# Patient Record
Sex: MALE | Race: BLACK OR AFRICAN AMERICAN | NOT HISPANIC OR LATINO | Employment: OTHER | URBAN - METROPOLITAN AREA
[De-identification: names, ages, dates, MRNs, and addresses within clinical notes are randomized per-mention and may not be internally consistent; named-entity substitution may affect disease eponyms.]

---

## 2019-04-16 ENCOUNTER — HOSPITAL ENCOUNTER (EMERGENCY)
Facility: OTHER | Age: 69
Discharge: HOME | End: 2019-04-16
Attending: EMERGENCY MEDICINE
Payer: MEDICARE

## 2019-04-16 VITALS
HEIGHT: 69 IN | TEMPERATURE: 97 F | HEART RATE: 48 BPM | BODY MASS INDEX: 25.77 KG/M2 | WEIGHT: 174 LBS | SYSTOLIC BLOOD PRESSURE: 225 MMHG | DIASTOLIC BLOOD PRESSURE: 105 MMHG | OXYGEN SATURATION: 95 % | RESPIRATION RATE: 18 BRPM

## 2019-04-16 DIAGNOSIS — I10 HYPERTENSION, UNSPECIFIED TYPE: ICD-10-CM

## 2019-04-16 DIAGNOSIS — R79.89 ELEVATED SERUM CREATININE: ICD-10-CM

## 2019-04-16 DIAGNOSIS — R42 DIZZINESS: Primary | ICD-10-CM

## 2019-04-16 LAB
ALBUMIN SERPL BCP-MCNC: 3.8 G/DL (ref 3.5–5.2)
ALP SERPL-CCNC: 69 U/L (ref 55–135)
ALT SERPL W/O P-5'-P-CCNC: 7 U/L (ref 10–44)
ANION GAP SERPL CALC-SCNC: 11 MMOL/L (ref 8–16)
AST SERPL-CCNC: 12 U/L (ref 10–40)
BASOPHILS # BLD AUTO: 0.05 K/UL (ref 0–0.2)
BASOPHILS NFR BLD: 0.8 % (ref 0–1.9)
BILIRUB SERPL-MCNC: 0.5 MG/DL (ref 0.1–1)
BUN SERPL-MCNC: 19 MG/DL (ref 8–23)
CALCIUM SERPL-MCNC: 10.1 MG/DL (ref 8.7–10.5)
CHLORIDE SERPL-SCNC: 106 MMOL/L (ref 95–110)
CO2 SERPL-SCNC: 23 MMOL/L (ref 23–29)
CREAT SERPL-MCNC: 2 MG/DL (ref 0.5–1.4)
DIFFERENTIAL METHOD: ABNORMAL
EOSINOPHIL # BLD AUTO: 0.1 K/UL (ref 0–0.5)
EOSINOPHIL NFR BLD: 1.7 % (ref 0–8)
ERYTHROCYTE [DISTWIDTH] IN BLOOD BY AUTOMATED COUNT: 15.4 % (ref 11.5–14.5)
EST. GFR  (AFRICAN AMERICAN): 39 ML/MIN/1.73 M^2
EST. GFR  (NON AFRICAN AMERICAN): 34 ML/MIN/1.73 M^2
GLUCOSE SERPL-MCNC: 89 MG/DL (ref 70–110)
HCT VFR BLD AUTO: 40.1 % (ref 40–54)
HGB BLD-MCNC: 14.3 G/DL (ref 14–18)
LYMPHOCYTES # BLD AUTO: 1.9 K/UL (ref 1–4.8)
LYMPHOCYTES NFR BLD: 29.1 % (ref 18–48)
MCH RBC QN AUTO: 29.8 PG (ref 27–31)
MCHC RBC AUTO-ENTMCNC: 35.7 G/DL (ref 32–36)
MCV RBC AUTO: 84 FL (ref 82–98)
MONOCYTES # BLD AUTO: 0.5 K/UL (ref 0.3–1)
MONOCYTES NFR BLD: 7.4 % (ref 4–15)
NEUTROPHILS # BLD AUTO: 3.9 K/UL (ref 1.8–7.7)
NEUTROPHILS NFR BLD: 60.8 % (ref 38–73)
PLATELET # BLD AUTO: 217 K/UL (ref 150–350)
PMV BLD AUTO: 10.8 FL (ref 9.2–12.9)
POTASSIUM SERPL-SCNC: 3 MMOL/L (ref 3.5–5.1)
PROT SERPL-MCNC: 7.6 G/DL (ref 6–8.4)
RBC # BLD AUTO: 4.8 M/UL (ref 4.6–6.2)
SODIUM SERPL-SCNC: 140 MMOL/L (ref 136–145)
WBC # BLD AUTO: 6.45 K/UL (ref 3.9–12.7)

## 2019-04-16 PROCEDURE — 25000003 PHARM REV CODE 250: Performed by: PHYSICIAN ASSISTANT

## 2019-04-16 PROCEDURE — 80053 COMPREHEN METABOLIC PANEL: CPT

## 2019-04-16 PROCEDURE — 85025 COMPLETE CBC W/AUTO DIFF WBC: CPT

## 2019-04-16 PROCEDURE — 96360 HYDRATION IV INFUSION INIT: CPT

## 2019-04-16 PROCEDURE — 99283 EMERGENCY DEPT VISIT LOW MDM: CPT | Mod: 25

## 2019-04-16 RX ORDER — LOSARTAN POTASSIUM 50 MG/1
50 TABLET ORAL DAILY
Qty: 30 TABLET | Refills: 0 | Status: SHIPPED | OUTPATIENT
Start: 2019-04-16 | End: 2019-05-16

## 2019-04-16 RX ORDER — POTASSIUM CHLORIDE 750 MG/1
20 TABLET, EXTENDED RELEASE ORAL DAILY
Qty: 14 TABLET | Refills: 0 | Status: SHIPPED | OUTPATIENT
Start: 2019-04-16 | End: 2019-04-23

## 2019-04-16 RX ORDER — POTASSIUM CHLORIDE 20 MEQ/15ML
40 SOLUTION ORAL
Status: COMPLETED | OUTPATIENT
Start: 2019-04-16 | End: 2019-04-16

## 2019-04-16 RX ORDER — HYDROCHLOROTHIAZIDE 25 MG/1
25 TABLET ORAL
Status: COMPLETED | OUTPATIENT
Start: 2019-04-16 | End: 2019-04-16

## 2019-04-16 RX ORDER — LISINOPRIL AND HYDROCHLOROTHIAZIDE 20; 25 MG/1; MG/1
1 TABLET ORAL DAILY
Qty: 30 TABLET | Refills: 0 | Status: SHIPPED | OUTPATIENT
Start: 2019-04-16 | End: 2019-04-16 | Stop reason: ALTCHOICE

## 2019-04-16 RX ORDER — LISINOPRIL 10 MG/1
20 TABLET ORAL
Status: COMPLETED | OUTPATIENT
Start: 2019-04-16 | End: 2019-04-16

## 2019-04-16 RX ADMIN — LISINOPRIL 20 MG: 10 TABLET ORAL at 08:04

## 2019-04-16 RX ADMIN — POTASSIUM CHLORIDE 40 MEQ: 40 SOLUTION ORAL at 09:04

## 2019-04-16 RX ADMIN — SODIUM CHLORIDE 1000 ML: 0.9 INJECTION, SOLUTION INTRAVENOUS at 08:04

## 2019-04-16 RX ADMIN — HYDROCHLOROTHIAZIDE 25 MG: 25 TABLET ORAL at 08:04

## 2019-04-17 NOTE — ED TRIAGE NOTES
Pt reports hx of hypertension, states he has been out of his BP medication x 3 weeks, reports dizziness. Pt unsure what medication he is suppose to take. Pt is AAO x 3, answers questions appropriately

## 2019-04-17 NOTE — DISCHARGE INSTRUCTIONS
Your potassium is low (3.0), and Creatinine is high (2.0). You will need repeat blood tests with your primary care doctor in 1 week.    Return to the ER for any worsening symptoms.

## 2019-04-17 NOTE — ED PROVIDER NOTES
"Encounter Date: 4/16/2019       History     Chief Complaint   Patient presents with    Hypertension     pt out of unknown BP medication x3 weeks. pt reports dizziness but denies sob, cp, vision changes, n/v, headache.      67 y/o male with history of HTN presents to the ER with chief complaint of dizziness, which has been intermittent since this morning.   Patient says that he felt like he was "staggering" and lightheaded. This occurs when he stands from a seated position.  He denies syncope or room spinning sensation.  He denies headache, vision changes, difficulty speaking, facial asymmetry , nausea, vomiting, fever, chest pain, SOB.  He says he has been out of his blood pressure medication for 3 weeks.  Patient says his doctor increased his dose to 20 mg daily in October of 2018, but he does not know the name of his medication.          Review of patient's allergies indicates:  No Known Allergies  Past Medical History:   Diagnosis Date    Hypertension      History reviewed. No pertinent surgical history.  History reviewed. No pertinent family history.  Social History     Tobacco Use    Smoking status: Former Smoker   Substance Use Topics    Alcohol use: Not Currently    Drug use: Not on file     Review of Systems   Constitutional: Negative for chills and fever.   HENT: Negative for sore throat.    Eyes: Negative for visual disturbance.   Respiratory: Negative for shortness of breath.    Cardiovascular: Negative for chest pain.   Gastrointestinal: Negative for abdominal pain, nausea and vomiting.   Genitourinary: Negative for dysuria.   Musculoskeletal: Negative for back pain.   Skin: Negative for rash.   Neurological: Positive for dizziness. Negative for speech difficulty, weakness, numbness and headaches.   Hematological: Does not bruise/bleed easily.   Psychiatric/Behavioral: Negative for confusion.       Physical Exam     Initial Vitals [04/16/19 1848]   BP Pulse Resp Temp SpO2   (!) 204/95 66 18 97.7 °F " (36.5 °C) 99 %      MAP       --         Physical Exam    Nursing note and vitals reviewed.  Constitutional: He appears well-developed and well-nourished.   HENT:   Head: Atraumatic.   Mouth/Throat: Oropharynx is clear and moist.   Eyes: Conjunctivae and EOM are normal. Pupils are equal, round, and reactive to light.   Neck: Normal range of motion. Neck supple.   Cardiovascular: Normal rate, regular rhythm and intact distal pulses.   Pulmonary/Chest: Breath sounds normal. No respiratory distress. He has no wheezes. He has no rhonchi. He has no rales.   Abdominal: Soft. Bowel sounds are normal. There is no tenderness.   Neurological: He is alert and oriented to person, place, and time. He has normal strength. No cranial nerve deficit or sensory deficit. He displays a negative Romberg sign. Gait normal.   Normal finger to nose   Skin: No rash noted.   Psychiatric: He has a normal mood and affect.         ED Course   Procedures  Labs Reviewed   CBC W/ AUTO DIFFERENTIAL - Abnormal; Notable for the following components:       Result Value    RDW 15.4 (*)     All other components within normal limits   COMPREHENSIVE METABOLIC PANEL - Abnormal; Notable for the following components:    Potassium 3.0 (*)     Creatinine 2.0 (*)     ALT 7 (*)     eGFR if  39 (*)     eGFR if non  34 (*)     All other components within normal limits          Imaging Results    None                APC / Resident Notes:   Patient presents to the ER for evaluation of dizziness, which has been intermittent since this morning as well as being out of his blood pressure medications.  I have contacted the patient's pharmacy to confirm which antihypertensives he has been prescribed.  The pharmacist informed me that he last filled lisinopril/HCTZ 20/25 mg on July 26, 2018 for a 90 day supply.  The patient admits that it has been months since he took his blood pressure medications.  He is complaining of lightheadedness with  standing today.  I have considered dehydration, electrolyte abnormalities, acute kidney injury, I will order a blood work, give IV fluids, give home dose of antihypertensives and reassess.  Patient does not have any neuro deficits on exam, weakness, numbness, ataxia, trouble with speech or vision and his evaluation is not concerning for CVA.  I discussed the care this patient my supervising physician.  Patient was also seen by her.   The patient has a history of hypertension and and this is likely long-standing uncontrolled hypertension.   Patient has Creatinine of 2.0 and hypokalemia with K=3.0.  Will replace potassium orally and give prescription for potassium 20 meQ x 1 week.  I have advised increased fluids, he has received 1 liter IV fluids in the ED and no longer has lightheadedness with standing.  Will provided with PCP clinic # and advise that he f/u with PCP in 1 week for repeat labs.  Patient is in agreement with plan.  He says he is feeling better prior to discharge.    BP increased to 225/105 prior to discharge, will give prescription for losartan for him to take instead of lisinopril.  He is given strict return precautions.                       Clinical Impression:       ICD-10-CM ICD-9-CM   1. Dizziness R42 780.4   2. Hypertension, unspecified type I10 401.9   3. Elevated serum creatinine R79.89 790.99                                ARIN Vitale  04/16/19 8746

## 2023-02-12 ENCOUNTER — HOSPITAL ENCOUNTER (INPATIENT)
Facility: OTHER | Age: 73
LOS: 5 days | Discharge: HOME HEALTH | DRG: 280 | End: 2023-02-17
Attending: EMERGENCY MEDICINE | Admitting: HOSPITALIST
Payer: MEDICARE

## 2023-02-12 DIAGNOSIS — R53.1 WEAKNESS: ICD-10-CM

## 2023-02-12 DIAGNOSIS — G93.40 ENCEPHALOPATHY ACUTE: Primary | ICD-10-CM

## 2023-02-12 DIAGNOSIS — F19.10 POLYSUBSTANCE ABUSE: ICD-10-CM

## 2023-02-12 DIAGNOSIS — R79.89 ELEVATED TROPONIN: ICD-10-CM

## 2023-02-12 DIAGNOSIS — T68.XXXA HYPOTHERMIA, INITIAL ENCOUNTER: ICD-10-CM

## 2023-02-12 DIAGNOSIS — J96.01 ACUTE RESPIRATORY FAILURE WITH HYPOXIA: ICD-10-CM

## 2023-02-12 DIAGNOSIS — I16.1 HYPERTENSIVE EMERGENCY: ICD-10-CM

## 2023-02-12 DIAGNOSIS — J96.02 ACUTE HYPERCAPNIC RESPIRATORY FAILURE: ICD-10-CM

## 2023-02-12 DIAGNOSIS — R53.81 DEBILITY: ICD-10-CM

## 2023-02-12 PROBLEM — E87.20 METABOLIC ACIDOSIS: Status: ACTIVE | Noted: 2023-02-12

## 2023-02-12 PROBLEM — D72.829 LEUKOCYTOSIS: Status: ACTIVE | Noted: 2023-02-12

## 2023-02-12 PROBLEM — N17.9 ACUTE RENAL FAILURE: Status: ACTIVE | Noted: 2023-02-12

## 2023-02-12 PROBLEM — G93.41 ENCEPHALOPATHY, METABOLIC: Status: ACTIVE | Noted: 2023-02-12

## 2023-02-12 LAB
ALLENS TEST: ABNORMAL
AMMONIA PLAS-SCNC: 61 UMOL/L (ref 10–50)
AMPHET+METHAMPHET UR QL: NEGATIVE
BACTERIA #/AREA URNS HPF: ABNORMAL /HPF
BARBITURATES UR QL SCN>200 NG/ML: NEGATIVE
BASOPHILS # BLD AUTO: ABNORMAL K/UL (ref 0–0.2)
BASOPHILS NFR BLD: 0 % (ref 0–1.9)
BENZODIAZ UR QL SCN>200 NG/ML: NEGATIVE
BILIRUB UR QL STRIP: NEGATIVE
BNP SERPL-MCNC: 863 PG/ML (ref 0–99)
BZE UR QL SCN: ABNORMAL
CANNABINOIDS UR QL SCN: ABNORMAL
CK SERPL-CCNC: 434 U/L (ref 20–200)
CLARITY UR: CLEAR
COLOR UR: YELLOW
CREAT UR-MCNC: 33.4 MG/DL (ref 23–375)
DELSYS: ABNORMAL
DIFFERENTIAL METHOD: ABNORMAL
EOSINOPHIL # BLD AUTO: ABNORMAL K/UL (ref 0–0.5)
EOSINOPHIL NFR BLD: 0 % (ref 0–8)
ERYTHROCYTE [DISTWIDTH] IN BLOOD BY AUTOMATED COUNT: 15.4 % (ref 11.5–14.5)
ERYTHROCYTE [SEDIMENTATION RATE] IN BLOOD BY WESTERGREN METHOD: 24 MM/H
ETHANOL SERPL-MCNC: <10 MG/DL
ETHANOL UR-MCNC: <10 MG/DL
FIO2: 100
FIO2: 35
GLUCOSE UR QL STRIP: ABNORMAL
HCO3 UR-SCNC: 26 MMOL/L (ref 24–28)
HCO3 UR-SCNC: 26.7 MMOL/L (ref 24–28)
HCT VFR BLD AUTO: 45.3 % (ref 40–54)
HCT VFR BLD CALC: 42 %PCV (ref 36–54)
HCT VFR BLD CALC: 48 %PCV (ref 36–54)
HGB BLD-MCNC: 14 G/DL
HGB BLD-MCNC: 15 G/DL (ref 14–18)
HGB BLD-MCNC: 16 G/DL
HGB UR QL STRIP: ABNORMAL
HYALINE CASTS #/AREA URNS LPF: 3 /LPF
IMM GRANULOCYTES # BLD AUTO: ABNORMAL K/UL (ref 0–0.04)
IMM GRANULOCYTES NFR BLD AUTO: ABNORMAL % (ref 0–0.5)
KETONES UR QL STRIP: NEGATIVE
LACTATE SERPL-SCNC: 2.5 MMOL/L (ref 0.5–2.2)
LACTATE SERPL-SCNC: 5.1 MMOL/L (ref 0.5–2.2)
LDH SERPL L TO P-CCNC: 5.38 MMOL/L (ref 0.5–2.2)
LEUKOCYTE ESTERASE UR QL STRIP: NEGATIVE
LYMPHOCYTES # BLD AUTO: ABNORMAL K/UL (ref 1–4.8)
LYMPHOCYTES NFR BLD: 8 % (ref 18–48)
MAGNESIUM SERPL-MCNC: 2.2 MG/DL (ref 1.6–2.6)
MCH RBC QN AUTO: 30.4 PG (ref 27–31)
MCHC RBC AUTO-ENTMCNC: 33.1 G/DL (ref 32–36)
MCV RBC AUTO: 92 FL (ref 82–98)
METHADONE UR QL SCN>300 NG/ML: NEGATIVE
MICROSCOPIC COMMENT: ABNORMAL
MODE: ABNORMAL
MONOCYTES # BLD AUTO: ABNORMAL K/UL (ref 0.3–1)
MONOCYTES NFR BLD: 9 % (ref 4–15)
NEUTROPHILS # BLD AUTO: ABNORMAL K/UL (ref 1.8–7.7)
NEUTROPHILS NFR BLD: 79 % (ref 38–73)
NEUTS BAND NFR BLD MANUAL: 4 %
NITRITE UR QL STRIP: NEGATIVE
NRBC BLD-RTO: 0 /100 WBC
OPIATES UR QL SCN: NEGATIVE
PCO2 BLDA: 53.3 MMHG (ref 35–45)
PCO2 BLDA: 71 MMHG (ref 35–45)
PCP UR QL SCN>25 NG/ML: NEGATIVE
PEEP: 5
PH SMN: 7.18 [PH] (ref 7.35–7.45)
PH SMN: 7.3 [PH] (ref 7.35–7.45)
PH UR STRIP: 7 [PH] (ref 5–8)
PLATELET # BLD AUTO: 258 K/UL (ref 150–450)
PLATELET BLD QL SMEAR: ABNORMAL
PMV BLD AUTO: 11.1 FL (ref 9.2–12.9)
PO2 BLDA: 330 MMHG (ref 80–100)
PO2 BLDA: 82 MMHG (ref 80–100)
POC BE: -2 MMOL/L
POC BE: 0 MMOL/L
POC IONIZED CALCIUM: 1.13 MMOL/L (ref 1.06–1.42)
POC IONIZED CALCIUM: 1.17 MMOL/L (ref 1.06–1.42)
POC SATURATED O2: 100 % (ref 95–100)
POC SATURATED O2: 94 % (ref 95–100)
POC TCO2: 28 MMOL/L (ref 23–27)
POC TCO2: 29 MMOL/L (ref 23–27)
POTASSIUM BLD-SCNC: 2.9 MMOL/L (ref 3.5–5.1)
POTASSIUM BLD-SCNC: 3.5 MMOL/L (ref 3.5–5.1)
PROCALCITONIN SERPL IA-MCNC: 1.09 NG/ML
PROT UR QL STRIP: ABNORMAL
RBC # BLD AUTO: 4.94 M/UL (ref 4.6–6.2)
RBC #/AREA URNS HPF: 1 /HPF (ref 0–4)
SAMPLE: ABNORMAL
SAMPLE: NORMAL
SARS-COV-2 RDRP RESP QL NAA+PROBE: NEGATIVE
SITE: ABNORMAL
SODIUM BLD-SCNC: 141 MMOL/L (ref 136–145)
SODIUM BLD-SCNC: 142 MMOL/L (ref 136–145)
SP GR UR STRIP: 1.01 (ref 1–1.03)
TOXICOLOGY INFORMATION: ABNORMAL
TROPONIN I SERPL DL<=0.01 NG/ML-MCNC: 0.05 NG/ML (ref 0–0.03)
URN SPEC COLLECT METH UR: ABNORMAL
UROBILINOGEN UR STRIP-ACNC: NEGATIVE EU/DL
VT: 300
WBC # BLD AUTO: 23.42 K/UL (ref 3.9–12.7)
WBC #/AREA URNS HPF: 2 /HPF (ref 0–5)

## 2023-02-12 PROCEDURE — 82550 ASSAY OF CK (CPK): CPT | Performed by: EMERGENCY MEDICINE

## 2023-02-12 PROCEDURE — 81000 URINALYSIS NONAUTO W/SCOPE: CPT | Performed by: EMERGENCY MEDICINE

## 2023-02-12 PROCEDURE — 93005 ELECTROCARDIOGRAM TRACING: CPT

## 2023-02-12 PROCEDURE — 63600175 PHARM REV CODE 636 W HCPCS: Performed by: HOSPITALIST

## 2023-02-12 PROCEDURE — 20000000 HC ICU ROOM

## 2023-02-12 PROCEDURE — 80307 DRUG TEST PRSMV CHEM ANLYZR: CPT | Performed by: EMERGENCY MEDICINE

## 2023-02-12 PROCEDURE — 63600175 PHARM REV CODE 636 W HCPCS

## 2023-02-12 PROCEDURE — 36600 WITHDRAWAL OF ARTERIAL BLOOD: CPT

## 2023-02-12 PROCEDURE — 25000003 PHARM REV CODE 250

## 2023-02-12 PROCEDURE — 99900026 HC AIRWAY MAINTENANCE (STAT)

## 2023-02-12 PROCEDURE — 94761 N-INVAS EAR/PLS OXIMETRY MLT: CPT

## 2023-02-12 PROCEDURE — 96365 THER/PROPH/DIAG IV INF INIT: CPT

## 2023-02-12 PROCEDURE — 87150 DNA/RNA AMPLIFIED PROBE: CPT | Mod: 59 | Performed by: EMERGENCY MEDICINE

## 2023-02-12 PROCEDURE — 99223 1ST HOSP IP/OBS HIGH 75: CPT | Mod: ,,, | Performed by: HOSPITALIST

## 2023-02-12 PROCEDURE — 27000221 HC OXYGEN, UP TO 24 HOURS

## 2023-02-12 PROCEDURE — 80053 COMPREHEN METABOLIC PANEL: CPT | Performed by: EMERGENCY MEDICINE

## 2023-02-12 PROCEDURE — 82077 ASSAY SPEC XCP UR&BREATH IA: CPT | Performed by: EMERGENCY MEDICINE

## 2023-02-12 PROCEDURE — 99223 PR INITIAL HOSPITAL CARE,LEVL III: ICD-10-PCS | Mod: ,,, | Performed by: HOSPITALIST

## 2023-02-12 PROCEDURE — 31500 INSERT EMERGENCY AIRWAY: CPT

## 2023-02-12 PROCEDURE — 83880 ASSAY OF NATRIURETIC PEPTIDE: CPT | Performed by: EMERGENCY MEDICINE

## 2023-02-12 PROCEDURE — 82803 BLOOD GASES ANY COMBINATION: CPT

## 2023-02-12 PROCEDURE — 99292 CRITICAL CARE ADDL 30 MIN: CPT

## 2023-02-12 PROCEDURE — 85007 BL SMEAR W/DIFF WBC COUNT: CPT | Performed by: EMERGENCY MEDICINE

## 2023-02-12 PROCEDURE — 63600175 PHARM REV CODE 636 W HCPCS: Performed by: EMERGENCY MEDICINE

## 2023-02-12 PROCEDURE — 25000003 PHARM REV CODE 250: Performed by: HOSPITALIST

## 2023-02-12 PROCEDURE — 83735 ASSAY OF MAGNESIUM: CPT | Performed by: EMERGENCY MEDICINE

## 2023-02-12 PROCEDURE — 82140 ASSAY OF AMMONIA: CPT | Performed by: EMERGENCY MEDICINE

## 2023-02-12 PROCEDURE — 85027 COMPLETE CBC AUTOMATED: CPT | Performed by: EMERGENCY MEDICINE

## 2023-02-12 PROCEDURE — 94002 VENT MGMT INPAT INIT DAY: CPT

## 2023-02-12 PROCEDURE — 96366 THER/PROPH/DIAG IV INF ADDON: CPT

## 2023-02-12 PROCEDURE — 99900035 HC TECH TIME PER 15 MIN (STAT)

## 2023-02-12 PROCEDURE — 93010 EKG 12-LEAD: ICD-10-PCS | Mod: ,,, | Performed by: INTERNAL MEDICINE

## 2023-02-12 PROCEDURE — 83605 ASSAY OF LACTIC ACID: CPT | Mod: 91 | Performed by: HOSPITALIST

## 2023-02-12 PROCEDURE — 93010 ELECTROCARDIOGRAM REPORT: CPT | Mod: ,,, | Performed by: INTERNAL MEDICINE

## 2023-02-12 PROCEDURE — 84484 ASSAY OF TROPONIN QUANT: CPT | Performed by: EMERGENCY MEDICINE

## 2023-02-12 PROCEDURE — 99291 CRITICAL CARE FIRST HOUR: CPT | Mod: 25

## 2023-02-12 PROCEDURE — U0002 COVID-19 LAB TEST NON-CDC: HCPCS | Performed by: NURSE PRACTITIONER

## 2023-02-12 PROCEDURE — 83605 ASSAY OF LACTIC ACID: CPT | Performed by: EMERGENCY MEDICINE

## 2023-02-12 PROCEDURE — 87040 BLOOD CULTURE FOR BACTERIA: CPT | Performed by: EMERGENCY MEDICINE

## 2023-02-12 PROCEDURE — 84145 PROCALCITONIN (PCT): CPT | Performed by: EMERGENCY MEDICINE

## 2023-02-12 PROCEDURE — 36415 COLL VENOUS BLD VENIPUNCTURE: CPT | Performed by: HOSPITALIST

## 2023-02-12 PROCEDURE — 25000003 PHARM REV CODE 250: Performed by: EMERGENCY MEDICINE

## 2023-02-12 PROCEDURE — 25000003 PHARM REV CODE 250: Performed by: NURSE PRACTITIONER

## 2023-02-12 RX ORDER — NICARDIPINE HYDROCHLORIDE 0.2 MG/ML
2.5 INJECTION INTRAVENOUS CONTINUOUS
Status: DISCONTINUED | OUTPATIENT
Start: 2023-02-12 | End: 2023-02-13

## 2023-02-12 RX ORDER — HEPARIN SODIUM 5000 [USP'U]/ML
5000 INJECTION, SOLUTION INTRAVENOUS; SUBCUTANEOUS EVERY 8 HOURS
Status: DISCONTINUED | OUTPATIENT
Start: 2023-02-12 | End: 2023-02-17 | Stop reason: HOSPADM

## 2023-02-12 RX ORDER — PANTOPRAZOLE SODIUM 40 MG/10ML
40 INJECTION, POWDER, LYOPHILIZED, FOR SOLUTION INTRAVENOUS DAILY
Status: DISCONTINUED | OUTPATIENT
Start: 2023-02-13 | End: 2023-02-17 | Stop reason: HOSPADM

## 2023-02-12 RX ORDER — PROPOFOL 10 MG/ML
INJECTION, EMULSION INTRAVENOUS
Status: COMPLETED
Start: 2023-02-12 | End: 2023-02-12

## 2023-02-12 RX ORDER — CHLORHEXIDINE GLUCONATE ORAL RINSE 1.2 MG/ML
15 SOLUTION DENTAL 2 TIMES DAILY
Status: DISCONTINUED | OUTPATIENT
Start: 2023-02-12 | End: 2023-02-17 | Stop reason: HOSPADM

## 2023-02-12 RX ORDER — FAMOTIDINE 10 MG/ML
20 INJECTION INTRAVENOUS 2 TIMES DAILY
Status: DISCONTINUED | OUTPATIENT
Start: 2023-02-12 | End: 2023-02-12

## 2023-02-12 RX ORDER — ETOMIDATE 2 MG/ML
20 INJECTION INTRAVENOUS
Status: COMPLETED | OUTPATIENT
Start: 2023-02-12 | End: 2023-02-12

## 2023-02-12 RX ORDER — NICARDIPINE HYDROCHLORIDE 0.2 MG/ML
INJECTION INTRAVENOUS
Status: COMPLETED
Start: 2023-02-12 | End: 2023-02-12

## 2023-02-12 RX ORDER — FAMOTIDINE 10 MG/ML
20 INJECTION INTRAVENOUS NIGHTLY
Status: DISCONTINUED | OUTPATIENT
Start: 2023-02-12 | End: 2023-02-15

## 2023-02-12 RX ORDER — LORAZEPAM 2 MG/ML
2 INJECTION INTRAMUSCULAR
Status: ACTIVE | OUTPATIENT
Start: 2023-02-12 | End: 2023-02-13

## 2023-02-12 RX ORDER — CHLORHEXIDINE GLUCONATE ORAL RINSE 1.2 MG/ML
15 SOLUTION DENTAL 2 TIMES DAILY
Status: DISCONTINUED | OUTPATIENT
Start: 2023-02-12 | End: 2023-02-12

## 2023-02-12 RX ORDER — SUCCINYLCHOLINE CHLORIDE 20 MG/ML
100 INJECTION INTRAMUSCULAR; INTRAVENOUS
Status: COMPLETED | OUTPATIENT
Start: 2023-02-12 | End: 2023-02-12

## 2023-02-12 RX ORDER — LORAZEPAM 2 MG/ML
INJECTION INTRAMUSCULAR
Status: COMPLETED
Start: 2023-02-12 | End: 2023-02-12

## 2023-02-12 RX ORDER — PROPOFOL 10 MG/ML
0-50 INJECTION, EMULSION INTRAVENOUS CONTINUOUS
Status: DISCONTINUED | OUTPATIENT
Start: 2023-02-12 | End: 2023-02-13

## 2023-02-12 RX ORDER — MUPIROCIN 20 MG/G
OINTMENT TOPICAL 2 TIMES DAILY
Status: DISCONTINUED | OUTPATIENT
Start: 2023-02-12 | End: 2023-02-17 | Stop reason: HOSPADM

## 2023-02-12 RX ADMIN — MUPIROCIN: 20 OINTMENT TOPICAL at 10:02

## 2023-02-12 RX ADMIN — NICARDIPINE HYDROCHLORIDE 2.5 MG/HR: 0.2 INJECTION, SOLUTION INTRAVENOUS at 02:02

## 2023-02-12 RX ADMIN — SUCCINYLCHOLINE CHLORIDE 100 MG: 20 INJECTION, SOLUTION INTRAMUSCULAR; INTRAVENOUS; PARENTERAL at 01:02

## 2023-02-12 RX ADMIN — CEFEPIME 2 G: 2 INJECTION, POWDER, FOR SOLUTION INTRAMUSCULAR; INTRAVENOUS at 02:02

## 2023-02-12 RX ADMIN — PROPOFOL 1000 MG: 10 INJECTION, EMULSION INTRAVENOUS at 01:02

## 2023-02-12 RX ADMIN — FAMOTIDINE 20 MG: 10 INJECTION, SOLUTION INTRAVENOUS at 10:02

## 2023-02-12 RX ADMIN — LORAZEPAM 2 MG: 2 INJECTION INTRAMUSCULAR; INTRAVENOUS at 01:02

## 2023-02-12 RX ADMIN — PROPOFOL 20 MCG/KG/MIN: 10 INJECTION, EMULSION INTRAVENOUS at 10:02

## 2023-02-12 RX ADMIN — ETOMIDATE 20 MG: 2 INJECTION INTRAVENOUS at 01:02

## 2023-02-12 RX ADMIN — PROPOFOL 200 MG: 10 INJECTION, EMULSION INTRAVENOUS at 01:02

## 2023-02-12 RX ADMIN — VANCOMYCIN HYDROCHLORIDE 2000 MG: 500 INJECTION, POWDER, LYOPHILIZED, FOR SOLUTION INTRAVENOUS at 03:02

## 2023-02-12 RX ADMIN — NICARDIPINE HYDROCHLORIDE 2.5 MG/HR: 0.2 INJECTION INTRAVENOUS at 02:02

## 2023-02-12 RX ADMIN — CHLORHEXIDINE GLUCONATE 0.12% ORAL RINSE 15 ML: 1.2 LIQUID ORAL at 10:02

## 2023-02-12 RX ADMIN — SODIUM CHLORIDE 2400 ML: 9 INJECTION, SOLUTION INTRAVENOUS at 03:02

## 2023-02-12 RX ADMIN — HEPARIN SODIUM 5000 UNITS: 5000 INJECTION INTRAVENOUS; SUBCUTANEOUS at 10:02

## 2023-02-12 NOTE — Clinical Note
Diagnosis: Hypertensive emergency [020079]   Admitting Provider:: RAVI MANLEY [[id number]]   Future Attending Provider: RAVI MANLEY [[id number]]   Reason for IP Medical Treatment  (Clinical interventions that can only be accomplished in the IP setting? ) :: Vented on multiple drips   Estimated Length of Stay:: 2 midnights   I certify that Inpatient services for greater than or equal to 2 midnights are medically necessary:: No   Plans for Post-Acute care--if anticipated (pick the single best option):: A. No post acute care anticipated at this time   Special Needs:: Intubated [19]   Special Needs:: Complex IV Med Admin [7]

## 2023-02-12 NOTE — CARE UPDATE
02/12/23 1450   Patient Assessment/Suction   All Lung Fields Breath Sounds coarse   HAILY Breath Sounds coarse   LLL Breath Sounds coarse   RUL Breath Sounds coarse   RML Breath Sounds coarse   RLL Breath Sounds coarse   Rhythm/Pattern, Respiratory assisted mechanically   Cough Frequency frequent   Cough Type assisted   Suction Method tracheal;oral   $ Suction Charges Inline Suction Procedure Stat Charge   Secretions Amount moderate   Secretions Color clear   Secretions Characteristics thin   Skin Integrity   $ Wound Care Tech Time 15 min   Area Observed Left;Right;Behind ear;Cheek;Bridge of nose;Upper lip;Nares   Skin Appearance without discoloration   PRE-TX-O2   Device (Oxygen Therapy) ventilator   Oxygen Concentration (%) 100   SpO2 100 %   Pulse Oximetry Type Continuous   Pulse 101   Resp (!) 21   Temp 96.4 °F (35.8 °C)   BP (!) 154/83   General Safety Checklist   Safety Promotion/Fall Prevention side rails raised   Airway Safety   Ambu bag with the patient? Yes, Adult Ambu   Is mask with the patient? Yes, Adult Mask   Suction set is at the bedside? Yes   Airway Assistance   RT at bedside sws   Vent Select   Conventional Vent Y   Ventilator Initiated Yes   $ Ventilator Initial 1   Charged w/in last 24h YES   Preset Conventional Ventilator Settings   Vent Mode A/C   Humidity HME   Set Rate 18 BPM   Vt Set 300 mL   PEEP/CPAP 5 cmH20   Patient Ventilator Parameters   Resp Rate Total 24 br/min   All Fields Breath Sounds coarse   Peak Airway Pressure 23 cmH20   Mean Airway Pressure 11 cmH20   Exhaled Vt 569 mL   Total Ve 9.46 L/m   Total PEEP 5 cmH20   Tube Type ET   Conventional Ventilator Alarms   Alarms On Y   Ve High Alarm 22 L/min   Ve Low Alarm 2.05 L/min   Vt High Alarm 1100 mL   Vt Low Alarm 210 mL   Resp Rate High Alarm 60 br/min   Press High Alarm 60 cmH2O   Ready to Wean/Extubation Screen   FIO2<=50 (chart decimal) (!) 1   MV<16L (chart vol.) 9.46   PEEP <=8 (chart #) 5   Ready to Wean Parameters   F/VT  Ratio<105 (RSBI) (!) 36.91

## 2023-02-12 NOTE — ED NOTES
Intubated w/ 7.5 ETT (27cm Right), verified w/ colorimetric device, awaiting placement confirmation w/ CXR, Dr. Michael inserted 18fr. Oral GT, placed to suction, no drainage at this time.    Vent Settings:   AC 18/TV  300/ Fi02 100%/ Peep +5. Increase of oral secretions noted, suctioned w/ yankeur times 4.    Bear Hugger placed on pt and lea w/ thermometer device placed.   Pt's clothes placed in pt belongings bag, red gripper socks placed.

## 2023-02-12 NOTE — ED NOTES
Unknown age black male BIB EMS (given 6mg Narcan w/ poor return of responsiveness, GCS 10) , found unresponsive on neutral ground, pt w/ arms flexed, bilateral fists clenched, pt drooling and both eyes deviating to left downward.  Pt also noted to have clenched teeth, placed in trauma 2 and pt prepared for RSI.

## 2023-02-12 NOTE — H&P
"[facility]  History & Physical    Patient Name: Migdalia Dickerson  MRN: [id number]  Patient Class: IP- Inpatient  Admission Date: 2/12/2023  Attending Physician: Shilpi Rosenberg MD   Primary Care Provider: No primary care provider on file.         Patient information was obtained from ER records.     Subjective:     Principal Problem:Hypertensive emergency    Chief Complaint:   Chief Complaint   Patient presents with    Seizures     Unknown pt brought in by EMS for suspected overdose; pt found unresponsive, not breathing, given 6mg narcan total en route; upon arrival to ED, pt actively seizing        HPI: Unknown male was found down in the street.  It was reported that a bystander said his name was "Alejandro" but that person is no longer available to be interviewed.  The fire department reported pinpoint pupils with agonal respiration and he was given Narcan without improvement in his mentation.  On arrival to the hospital, he was noted to be drooling and contracted, and he started having seizure-like activity witnessed by staff.  Physical exam by the ER physician document eye deviation to the left and downward.  He was unable to protect his airway and was intubated.  Noted to be hypothermic with a temperature of 91.6° F, heart rate 131, duration 34, blood pressure 242/144.  Treatment initiated with Cardene drip for blood pressure control and patient was given empiric cefepime and vancomycin after obtaining cultures.  Workup in the ER, remarkable for head CT showed no acute abnormalities but showed "Foci of low attenuation involving the bilateral corona radiata and basal ganglia are suspected to reflect that of remote infarct." U tox remarkable for cocaine and marijuana, BUN and creatinine of 33/2.3, , , troponin 0.049, lactate 5.1 procalcitonin 1.09, serum alcohol negative, WBC 23.42, UA overall not remarkable, chest x-ray without definitive infiltrate, ABG pH 7.184 pCO2 " 71 PO2 330 bicarb 29.        No past medical history on file.    No past surgical history on file.    Review of patient's allergies indicates:  Not on File    No current facility-administered medications on file prior to encounter.     No current outpatient medications on file prior to encounter.     Family History    None       Tobacco Use    Smoking status: Not on file    Smokeless tobacco: Not on file   Substance and Sexual Activity    Alcohol use: Not on file    Drug use: Not on file    Sexual activity: Not on file     Review of Systems   Unable to perform ROS: Intubated   Objective:     Vital Signs (Most Recent):  Temp: 96.6 °F (35.9 °C) (02/12/23 1601)  Pulse: 100 (02/12/23 1601)  Resp: (!) 25 (02/12/23 1601)  BP: (!) 175/97 (02/12/23 1601)  SpO2: 100 % (02/12/23 1601)   Vital Signs (24h Range):  Temp:  [91.6 °F (33.1 °C)-96.6 °F (35.9 °C)] 96.6 °F (35.9 °C)  Pulse:  [] 100  Resp:  [21-36] 25  SpO2:  [97 %-100 %] 100 %  BP: (151-287)/() 175/97     Weight: 80 kg (176 lb 5.9 oz)  There is no height or weight on file to calculate BMI.    Physical Exam  Vitals and nursing note reviewed.   Constitutional:       Comments: Disheveled, thin, sedated   HENT:      Head: Normocephalic.      Comments: ET tube in place, mouth clenched tight around ET tube     Nose: Nose normal.   Eyes:      Conjunctiva/sclera: Conjunctivae normal.   Cardiovascular:      Rate and Rhythm: Regular rhythm. Tachycardia present.   Pulmonary:      Comments: Coarse ventilated breath sounds, no wheezing  Abdominal:      General: There is no distension.      Palpations: Abdomen is soft.      Tenderness: There is no abdominal tenderness. There is no guarding or rebound.      Comments: Hypoactive bowel sounds   Musculoskeletal:      Right lower leg: No edema.      Left lower leg: No edema.   Skin:     General: Skin is dry.      Capillary Refill: Capillary refill takes 2 to 3 seconds.      Comments: Cool to touch   Neurological:       Comments: Arms flexed under tension, grab reflex with attempt with tactile stimulation; withdrawal of left lower with noxious stimulation, reflexes were 2+; no response with verbal stimulation but patient is sedated on propofol; limited neurological exam   Psychiatric:      Comments: Sedated on vent           Significant Labs: All pertinent labs within the past 24 hours have been reviewed.    Significant Imaging: I have reviewed all pertinent imaging results/findings within the past 24 hours.    Assessment/Plan:     * Hypertensive emergency  - Patient met criteria with hypertensive emergency with end organ damage involving the CNS, respiratory, renal, and cardiac systems with systolic greater than 240s  - Encephalopathy likely multifactorial in nature with possible CVA versus seizure vs hypertensive; cannot rule out sepsis versus toxic encephalopathy due to illicit substances given U tox positive for cocaine and marijuana  - Initial head CT without acute findings, but does show remote infarct and microvascular ischemic changes, but CVA has not been completely ruled out at this time  - His hypothermia, could be CNS driven versus septic versus environmental given patient was found down outside for unknown duration of time during winter  - Goal to decrease blood pressure by 25% within the 1st 24 hours, targeting systolic blood pressure of less than 180  - Will continue empiric cefepime and vancomycin for now, follow-up cultures and quickly deescalate if no focus of infection found  - Will need further workup with repeat CT scan of the head or MRI of the brain after stabilization, and check EEG.  No need to start antiepileptic agents at this time given patient is on propofol and will have Ativan p.r.n. for any seizure-like activity  - Further workup with 2D echo and trend troponins and CPK, monitor on telemetry  - Consult Neurology and Pulmonary Critical Care  - Will consider consult to nephrology and Cardiology if  renal function does not improve and troponins continue trend upwards    Encephalopathy, metabolic  - As discussed above    Acute hypercapnic respiratory failure  - Intubated to protect airway  - Consult placed to Pulmonary Critical Care    Acute renal failure  - Unknown baseline renal function  - Replace Pan catheter, monitor strict I&Os  - Workup with renal ultrasound and check repeat labs  - Will consider Nephrology consult renal function does not improve    Elevated troponin  Elevated BNP  - As discussed above  - Will trend and check ECHO, clinically appears dry    Leukocytosis  - As above  - Will continue to trend    Hypothermia  - As discussed above  - Warming blanket    Metabolic acidosis  - As discussed above    Polysubstance abuse  - UTox positive for cocaine and marijuana    VTE Risk Mitigation (From admission, onward)         Ordered     heparin (porcine) injection 5,000 Units  Every 8 hours         02/12/23 1602     IP VTE HIGH RISK PATIENT  Once         02/12/23 1602     Place sequential compression device  Until discontinued         02/12/23 1602                   Shilpi Rosenberg MD  Department of Hospital Medicine   [facility], [place] - Emergency Dept

## 2023-02-12 NOTE — SUBJECTIVE & OBJECTIVE
No past medical history on file.    No past surgical history on file.    Review of patient's allergies indicates:  Not on File    No current facility-administered medications on file prior to encounter.     No current outpatient medications on file prior to encounter.     Family History    None       Tobacco Use    Smoking status: Not on file    Smokeless tobacco: Not on file   Substance and Sexual Activity    Alcohol use: Not on file    Drug use: Not on file    Sexual activity: Not on file     Review of Systems   Unable to perform ROS: Intubated   Objective:     Vital Signs (Most Recent):  Temp: 96.6 °F (35.9 °C) (02/12/23 1601)  Pulse: 100 (02/12/23 1601)  Resp: (!) 25 (02/12/23 1601)  BP: (!) 175/97 (02/12/23 1601)  SpO2: 100 % (02/12/23 1601)   Vital Signs (24h Range):  Temp:  [91.6 °F (33.1 °C)-96.6 °F (35.9 °C)] 96.6 °F (35.9 °C)  Pulse:  [] 100  Resp:  [21-36] 25  SpO2:  [97 %-100 %] 100 %  BP: (151-287)/() 175/97     Weight: 80 kg (176 lb 5.9 oz)  There is no height or weight on file to calculate BMI.    Physical Exam  Vitals and nursing note reviewed.   Constitutional:       Comments: Disheveled, thin, sedated   HENT:      Head: Normocephalic.      Comments: ET tube in place, mouth clenched tight around ET tube     Nose: Nose normal.   Eyes:      Conjunctiva/sclera: Conjunctivae normal.   Cardiovascular:      Rate and Rhythm: Regular rhythm. Tachycardia present.   Pulmonary:      Comments: Coarse ventilated breath sounds, no wheezing  Abdominal:      General: There is no distension.      Palpations: Abdomen is soft.      Tenderness: There is no abdominal tenderness. There is no guarding or rebound.      Comments: Hypoactive bowel sounds   Musculoskeletal:      Right lower leg: No edema.      Left lower leg: No edema.   Skin:     General: Skin is dry.      Capillary Refill: Capillary refill takes 2 to 3 seconds.      Comments: Cool to touch   Neurological:      Comments: Arms flexed under  tension, grab reflex with attempt with tactile stimulation; withdrawal of left lower with noxious stimulation, reflexes were 2+; no response with verbal stimulation but patient is sedated on propofol; limited neurological exam   Psychiatric:      Comments: Sedated on vent           Significant Labs: All pertinent labs within the past 24 hours have been reviewed.    Significant Imaging: I have reviewed all pertinent imaging results/findings within the past 24 hours.

## 2023-02-12 NOTE — PROGRESS NOTES
Pharmacokinetic Initial Assessment: IV Vancomycin    Assessment/Plan:    Vancomycin 2000 mg IV dose given in ED  Desired empiric serum trough concentration is 15 to 20 mcg/mL  Draw vancomycin random level on 2/13/23 at 1500. Re-dose if level is less than   20 mcg/ml    Please contact pharmacy at extension [phone] with any questions regarding this assessment.     Thank you for the consult,   Jay L Schwab       Patient brief summary:  Migdalia Dickerson is a 89 y.o. adult initiated on antimicrobial therapy with IV Vancomycin for treatment of suspected bacteremia    Drug Allergies:   Review of patient's allergies indicates:  Not on File    Actual Body Weight:   80 kg    Renal Function:   CrCl cannot be calculated (Unknown ideal weight.).,     Dialysis Method (if applicable):  N/A    CBC (last 72 hours):  Recent Labs   Lab Result Units 02/12/23  1414   WBC K/uL 23.42*   Hemoglobin g/dL 15.0   Hematocrit % 45.3   Platelets K/uL 258   Gran % % 79.0*   Lymph % % 8.0*   Mono % % 9.0   Eosinophil % % 0.0   Basophil % % 0.0   Differential Method  Manual       Metabolic Panel (last 72 hours):  Recent Labs   Lab Result Units 02/12/23  1414 02/12/23  1423   Sodium mmol/L 142  --    Potassium mmol/L 3.6  --    Chloride mmol/L 104  --    CO2 mmol/L 20*  --    Glucose mg/dL 232*  --    Glucose, UA   --  2+*   BUN mg/dL 33*  --    Creatinine mg/dL 2.3*  --    Creatinine, Urine mg/dL  --  33.4   Albumin g/dL 4.3  --    Total Bilirubin mg/dL 1.1*  --    Alkaline Phosphatase U/L 83  --    AST U/L 35  --    ALT U/L 23  --    Magnesium mg/dL 2.2  --        Drug levels (last 3 results):  No results for input(s): VANCOMYCINRA, VANCORANDOM, VANCOMYCINPE, VANCOPEAK, VANCOMYCINTR, VANCOTROUGH in the last 72 hours.    Microbiologic Results:  Microbiology Results (last 7 days)       Procedure Component Value Units Date/Time    Blood culture x two cultures. Draw prior to antibiotics. [[id number]] Collected: 02/12/23 1418    Order Status: Sent  Specimen: Blood from Peripheral, Jugular, External Right Updated: 02/12/23 1419    Blood culture x two cultures. Draw prior to antibiotics. [[id number]] Collected: 02/12/23 1416    Order Status: Sent Specimen: Blood from Wrist, Right Updated: 02/12/23 1410

## 2023-02-12 NOTE — ED PROVIDER NOTES
"SCRIBE #1 NOTE: I, Eboni Andrews, am scribing for, and in the presence of,  April Michael MD.       Source of History:  EMS personnel.    Chief complaint:  Seizures (Unknown pt brought in by EMS for suspected overdose; pt found unresponsive, not breathing, given 6mg narcan total en route; upon arrival to ED, pt actively seizing)      HPI:  Migdalia Dickerson is a 89 y.o. adult presenting after being found unresponsive on the street/sidewalk. The patient's full name is unknown, but a bystander stated his first name is "Alejandro." The fire department arrived to the scene first and noted pinpoint pupils with agonal respirations. In transport, EMS was unable to provide a GCS for the patient but stated the patient was responsive to pain. The patient had received 6 mg Narcan in total upon arrival to the ED. Here in the ED, patient with L gaze and decorticate posturing on arrival.      This is the extent to the patients complaints today here in the emergency department.    ROS: As per HPI and below:  Cannot be obtained due to condition of patient.    Review of patient's allergies indicates:  Not on File    PMH:  As per HPI and below:  No past medical history on file.  No past surgical history on file.         Physical Exam:    BP (!) 181/100   Pulse 99   Temp 99.7 °F (37.6 °C)   Resp (!) 23   Ht 5' 10" (1.778 m)   Wt 71.2 kg (156 lb 15.5 oz)   SpO2 100%   BMI 22.52 kg/m²   Nursing note and vital signs reviewed.    Appearance: Disheveled appearance. No obvious trauma.  Eyes: No conjunctival injection. Left sided eye deviation.  Neck: No deformity.   ENT: Clear secretions from mouth. Trismus of jaw.  Chest/ Respiratory: Sonorous respirations.  Sternotomy scar.   Cardiovascular: Heart rate 130-140 bpm.  Abdomen: Soft.  Not distended.  Nontender.  No guarding.  No rebound. Non-peritoneal.  Musculoskeletal: Neck supple.  No meningismus. Flexion of both arms.  Skin: Cool/pale skin.  No rashes seen.  Good turgor.  No " abrasions.  No ecchymoses.  Neurologic: Not responding to voice or localizing pain.     Intubation    Date/Time: 2/12/2023 1:40 PM  Location procedure was performed: [facility] EMERGENCY DEPARTMENT  Performed by: April Michael MD  Authorized by: April Michael MD   Consent Done: Emergent Situation  Laryngoscope size: Glide 4  Tube size: 7.5 mm  Number of attempts: 1  Cords visualized: yes  ETT to lip: 26 cm  Chest x-ray interpreted by radiologist and me.  Chest x-ray findings: endotracheal tube in appropriate position      Feeding Tube    Date/Time: 2/12/2023 1:40 PM  Location procedure was performed: [facility] EMERGENCY DEPARTMENT  Performed by: April Michael MD  Authorized by: April Michael MD   Consent: The procedure was performed in an emergent situation.  Tube type: orogastric.  Placement/position confirmation: x-ray  Comments: Aspirated gastric material.      Critical Care    Date/Time: 2/12/2023 1:40 PM  Performed by: April Michael MD  Authorized by: April Michael MD   Direct patient critical care time: 55 minutes  Additional history critical care time: 5 minutes  Ordering / reviewing critical care time: 10 minutes  Documentation critical care time: 10 minutes  Total critical care time (exclusive of procedural time) : 80 minutes  Critical care was time spent personally by me on the following activities: gastric intubation.      I decided to obtain the patient's medical records.  Summary of Medical Records:  None available, patient is not identified     EKG:  I independently reviewed and interpreted the EKG and my findings are as follows:   Sinus rhythm with 1st-degree block with PVCs, LVH repolarization abnormality, prolonged QT rate is 98    Results for orders placed or performed during the hospital encounter of 02/12/23   EKG 12-lead    Collection Time: 02/12/23  3:04 PM    Narrative    Test Reason : R53.1,    Vent. Rate : 098 BPM     Atrial Rate : 098 BPM     P-R Int : 256 ms          QRS Dur : 104 ms      QT Int :  400 ms       P-R-T Axes : 087 072 243 degrees     QTc Int : 510 ms    Sinus rhythm with 1st degree A-V block with occasional Premature  ventricular complexes and Premature atrial complexes  LVH with repolarization abnormality  Prolonged QT  Abnormal ECG      Referred By: System System           Confirmed By:          Imaging:  I independently reviewed and interpreted the patient's chest x-ray and my findings are as follows:  ET tube in good position above karina. NG tube above diaphragm. Pilar prominence. No focal consolidation. No edema. No effusion. No pneumothorax.    Imaging Results              US Retroperitoneal Complete (Final result)  Result time 02/12/23 18:18:32      Final result by Praneeth Malik MD (02/12/23 18:18:32)                   Impression:      1. There is suboptimal evaluation of the left kidney given its position.  The left renal resistive index is elevated as compared to the right, acute medical renal disease on the left is a consideration although not confirmed.  No hydronephrosis.      Electronically signed by: Praneeth Malik MD  Date:    02/12/2023  Time:    18:18               Narrative:    EXAMINATION:  US RETROPERITONEAL COMPLETE    CLINICAL HISTORY:  ARF;    TECHNIQUE:  Ultrasound of the kidneys and urinary bladder was performed including color flow and Doppler evaluation of the kidneys.    COMPARISON:  None.    FINDINGS:  Right kidney: The right kidney measures 11.6 cm. No cortical thinning. No loss of corticomedullary distinction. Resistive index measures 0.65.  No mass. No renal stone. No hydronephrosis.    Left kidney: The left kidney measures 8.7 cm. No cortical thinning. No loss of corticomedullary distinction. Resistive index measures 0.75.  No mass. No renal stone. No hydronephrosis.    The urinary bladder is decompressed, limiting evaluation.                                       CT Head Without Contrast (Final result)  Result time 02/12/23 14:54:12      Final result by  Praneeth Malik MD (02/12/23 14:54:12)                   Impression:      1. There is sequela of chronic microvascular ischemic change and senescent change.  Foci of low attenuation involving the bilateral corona radiata and basal ganglia are suspected to reflect that of remote infarct however no previous exams are available for comparison, correlation with current symptomatology is advised as age-indeterminate infarcts are not excluded.      Electronically signed by: Praneeth Malik MD  Date:    02/12/2023  Time:    14:54               Narrative:    EXAMINATION:  CT HEAD WITHOUT CONTRAST    CLINICAL HISTORY:  Mental status change, unknown cause;    TECHNIQUE:  Low dose axial images were obtained through the head.  Coronal and sagittal reformations were also performed. Contrast was not administered.    COMPARISON:  None.    FINDINGS:  There is motion artifact.    There is generalized cerebral volume loss.  There is hypoattenuation in a periventricular fashion, likely sequela of chronic microvascular ischemic change.There are foci of low attenuation involving the bilateral corona radiata extending to the bilateral basal ganglia suggesting remote infarcts although no previous exams are available for comparison.  There is septum cavum pellucidum.  There is no evidence of acute major vascular territory infarct, hemorrhage, or mass.  There is no hydrocephalus.  There are no abnormal extra-axial fluid collections.  The paranasal sinuses and mastoid air cells are clear, and there is no evidence of calvarial fracture.  The visualized soft tissues are unremarkable.                                       X-Ray Chest AP Portable (Final result)  Result time 02/12/23 13:57:01      Final result by Scout Gresham DO (02/12/23 13:57:01)                   Impression:      Please see above      Electronically signed by: Socut Gresham DO  Date:    02/12/2023  Time:    13:57               Narrative:    EXAMINATION:  XR CHEST AP  PORTABLE    CLINICAL HISTORY:  Sepsis;    TECHNIQUE:  Single frontal view of the chest was performed.    COMPARISON:  None    FINDINGS:  Endotracheal tube tip projected over the trachea air column approximately 4.4 cm above the karina.  There is a feeding tube which courses across the mediastinum into the abdomen with looping configuration in the upper abdomen tip not included in the field of view.  Question surgical clips left ann-marie.  Sternotomy wires overlie the cardiomediastinal silhouette.  There is slight fullness of the hilar vasculature which may be related to positioning versus vascular congestion.  There is no lung consolidation.  No large pleural effusion or pneumothorax.  Further evaluation as warranted clinically.                                      Labs:  Results for orders placed or performed during the hospital encounter of 02/12/23   Blood culture x two cultures. Draw prior to antibiotics.    Specimen: Wrist, Right; Blood   Result Value Ref Range    Blood Culture, Routine No Growth to date    Blood culture x two cultures. Draw prior to antibiotics.    Specimen: Peripheral, Jugular, External Right; Blood   Result Value Ref Range    Blood Culture, Routine No Growth to date    CBC auto differential   Result Value Ref Range    WBC 23.42 (H) 3.90 - 12.70 K/uL    RBC 4.94 4.60 - 6.20 M/uL    Hemoglobin 15.0 14.0 - 18.0 g/dL    Hematocrit 45.3 40.0 - 54.0 %    MCV 92 82 - 98 fL    MCH 30.4 27.0 - 31.0 pg    MCHC 33.1 32.0 - 36.0 g/dL    RDW 15.4 (H) 11.5 - 14.5 %    Platelets 258 150 - 450 K/uL    MPV 11.1 9.2 - 12.9 fL    Immature Granulocytes Test Not Performed 0.0 - 0.5 %    Gran # (ANC) Test Not Performed 1.8 - 7.7 K/uL    Immature Grans (Abs) Test Not Performed 0.00 - 0.04 K/uL    Lymph # Test Not Performed 1.0 - 4.8 K/uL    Mono # Test Not Performed 0.3 - 1.0 K/uL    Eos # Test Not Performed 0.0 - 0.5 K/uL    Baso # Test Not Performed 0.00 - 0.20 K/uL    nRBC 0 0 /100 WBC    Gran % 79.0 (H) 38.0 -  73.0 %    Lymph % 8.0 (L) 18.0 - 48.0 %    Mono % 9.0 4.0 - 15.0 %    Eosinophil % 0.0 0.0 - 8.0 %    Basophil % 0.0 0.0 - 1.9 %    Bands 4.0 %    Platelet Estimate Appears normal     Differential Method Manual    Comprehensive metabolic panel   Result Value Ref Range    Sodium 142 136 - 145 mmol/L    Potassium 3.6 3.5 - 5.1 mmol/L    Chloride 104 95 - 110 mmol/L    CO2 20 (L) 23 - 29 mmol/L    Glucose 232 (H) 70 - 110 mg/dL    BUN 33 (H) 10 - 30 mg/dL    Creatinine 2.3 (H) 0.5 - 1.4 mg/dL    Calcium 9.6 8.7 - 10.5 mg/dL    Total Protein 8.5 (H) 6.0 - 8.4 g/dL    Albumin 4.3 3.5 - 5.2 g/dL    Total Bilirubin 1.1 (H) 0.1 - 1.0 mg/dL    Alkaline Phosphatase 83 55 - 135 U/L    AST 35 10 - 40 U/L    ALT 23 10 - 44 U/L    Anion Gap 18 (H) 8 - 16 mmol/L    eGFR 22 (A) >60 mL/min/1.73 m^2   Lactic Acid, Plasma #1   Result Value Ref Range    Lactate (Lactic Acid) 5.1 (HH) 0.5 - 2.2 mmol/L   Urinalysis, Reflex to Urine Culture Urine, Catheterized    Specimen: Urine   Result Value Ref Range    Specimen UA Urine, Catheterized     Color, UA Yellow Yellow, Straw, Mouna    Appearance, UA Clear Clear    pH, UA 7.0 5.0 - 8.0    Specific Gravity, UA 1.010 1.005 - 1.030    Protein, UA 2+ (A) Negative    Glucose, UA 2+ (A) Negative    Ketones, UA Negative Negative    Bilirubin (UA) Negative Negative    Occult Blood UA 3+ (A) Negative    Nitrite, UA Negative Negative    Urobilinogen, UA Negative <2.0 EU/dL    Leukocytes, UA Negative Negative   Procalcitonin   Result Value Ref Range    Procalcitonin 1.09 (H) <0.25 ng/mL   Troponin I   Result Value Ref Range    Troponin I 0.049 (H) 0.000 - 0.026 ng/mL   Brain natriuretic peptide   Result Value Ref Range     (H) 0 - 99 pg/mL   Magnesium   Result Value Ref Range    Magnesium 2.2 1.6 - 2.6 mg/dL   Ethanol   Result Value Ref Range    Alcohol, Serum <10 <10 mg/dL   Ammonia   Result Value Ref Range    Ammonia 61 (H) 10 - 50 umol/L   Toxicology screen, urine   Result Value Ref Range     Alcohol, Urine <10 <10 mg/dL    Benzodiazepines Negative Negatiive    Methadone metabolites Negative Negative    Cocaine (Metab.) Presumptive Positive (A) Negative    Opiate Scrn, Ur Negative Negative    Barbiturate Screen, Ur Negative Negative    Amphetamine Screen, Ur Negative Negative    THC Presumptive Positive (A) Negative    Phencyclidine Negative Negative    Creatinine, Urine 33.4 23.0 - 375.0 mg/dL    Toxicology Information SEE COMMENT    Urinalysis Microscopic   Result Value Ref Range    RBC, UA 1 0 - 4 /hpf    WBC, UA 2 0 - 5 /hpf    Bacteria Occasional None-Occ /hpf    Hyaline Casts, UA 3 (A) 0-1/lpf /lpf    Microscopic Comment SEE COMMENT    CK   Result Value Ref Range     (H) 20 - 200 U/L   Lactic acid, plasma #2   Result Value Ref Range    Lactate (Lactic Acid) 2.5 (H) 0.5 - 2.2 mmol/L   COVID-19 Rapid Screening   Result Value Ref Range    SARS-CoV-2 RNA, Amplification, Qual Negative Negative   Comprehensive Metabolic Panel   Result Value Ref Range    Sodium 139 136 - 145 mmol/L    Potassium 3.6 3.5 - 5.1 mmol/L    Chloride 105 95 - 110 mmol/L    CO2 22 (L) 23 - 29 mmol/L    Glucose 83 70 - 110 mg/dL    BUN 26 10 - 30 mg/dL    Creatinine 1.6 (H) 0.5 - 1.4 mg/dL    Calcium 8.7 8.7 - 10.5 mg/dL    Total Protein 7.7 6.0 - 8.4 g/dL    Albumin 3.4 (L) 3.5 - 5.2 g/dL    Total Bilirubin 0.9 0.1 - 1.0 mg/dL    Alkaline Phosphatase 59 55 - 135 U/L    AST 33 10 - 40 U/L    ALT 19 10 - 44 U/L    Anion Gap 12 8 - 16 mmol/L    eGFR 34 (A) >60 mL/min/1.73 m^2   Phosphorus   Result Value Ref Range    Phosphorus 3.5 2.7 - 4.5 mg/dL   Magnesium   Result Value Ref Range    Magnesium 1.6 1.6 - 2.6 mg/dL   CBC Auto Differential   Result Value Ref Range    WBC 13.24 (H) 3.90 - 12.70 K/uL    RBC 4.31 (L) 4.60 - 6.20 M/uL    Hemoglobin 13.8 (L) 14.0 - 18.0 g/dL    Hematocrit 38.7 (L) 40.0 - 54.0 %    MCV 90 82 - 98 fL    MCH 32.0 (H) 27.0 - 31.0 pg    MCHC 35.7 32.0 - 36.0 g/dL    RDW 15.4 (H) 11.5 - 14.5 %     Platelets 186 150 - 450 K/uL    MPV 12.0 9.2 - 12.9 fL    Immature Granulocytes 0.6 (H) 0.0 - 0.5 %    Gran # (ANC) 11.3 (H) 1.8 - 7.7 K/uL    Immature Grans (Abs) 0.08 (H) 0.00 - 0.04 K/uL    Lymph # 0.9 (L) 1.0 - 4.8 K/uL    Mono # 1.0 0.3 - 1.0 K/uL    Eos # 0.0 0.0 - 0.5 K/uL    Baso # 0.05 0.00 - 0.20 K/uL    nRBC 0 0 /100 WBC    Gran % 84.9 (H) 38.0 - 73.0 %    Lymph % 6.4 (L) 18.0 - 48.0 %    Mono % 7.6 4.0 - 15.0 %    Eosinophil % 0.1 0.0 - 8.0 %    Basophil % 0.4 0.0 - 1.9 %    Differential Method Automated    Troponin I   Result Value Ref Range    Troponin I 0.752 (H) 0.000 - 0.026 ng/mL   Echo Saline Bubble? Yes   Result Value Ref Range    BSA 1.88 m2    TDI SEPTAL 0.03 m/s    LV LATERAL E/E' RATIO 13.40 m/s    LV SEPTAL E/E' RATIO 22.33 m/s    LA WIDTH 5.40 cm    IVC diameter 18 cm    Left Ventricular Outflow Tract Mean Velocity 0.75 cm/s    Left Ventricular Outflow Tract Mean Gradient 2.47 mmHg    TDI LATERAL 0.05 m/s    PV PEAK VELOCITY 0.90 cm/s    LVIDd 5.51 3.5 - 6.0 cm    IVS 1.40 (A) 0.6 - 1.1 cm    Posterior Wall 1.19 (A) 0.6 - 1.1 cm    LVIDs 4.95 (A) 2.1 - 4.0 cm    FS 10 28 - 44 %    LA volume 109.11 cm3    Sinus 3.44 cm    LV mass 303.58 g    LA size 4.36 cm    TAPSE 1.33 cm    Left Ventricle Relative Wall Thickness 0.43 cm    AV mean gradient 5 mmHg    AV valve area 3.62 cm2    AV Velocity Ratio 0.68     AV index (prosthetic) 0.82     MV mean gradient 1 mmHg    MV valve area p 1/2 method 3.84 cm2    MV valve area by continuity eq 13.23 cm2    E/A ratio 0.74     Mean e' 0.04 m/s    E wave deceleration time 197.69 msec    IVRT 54.23 msec    Pulm vein S/D ratio 1.20     LVOT diameter 2.37 cm    LVOT area 4.4 cm2    LVOT peak rafael 1.00 m/s    LVOT peak VTI 20.70 cm    Ao peak rafael 1.46 m/s    Ao VTI 25.2 cm    Mr max rafael 4.93 m/s    LVOT stroke volume 91.27 cm3    AV peak gradient 9 mmHg    MV peak gradient 1 mmHg    E/E' ratio 16.75 m/s    MV Peak E Rafael 0.67 m/s    TR Max Rafael 2.67 m/s    MV VTI  6.9 cm    MV stenosis pressure 1/2 time 57.33 ms    MV Peak A Rafael 0.90 m/s    PV Peak S Rafael 0.49 m/s    PV Peak D Rafael 0.41 m/s    LV Systolic Volume 115.52 mL    LV Systolic Volume Index 61.4 mL/m2    LV Diastolic Volume 147.82 mL    LV Diastolic Volume Index 78.63 mL/m2    LA Volume Index 58.0 mL/m2    LV Mass Index 161 g/m2    RA Major Axis 5.05 cm    Left Atrium Minor Axis 5.58 cm    Left Atrium Major Axis 5.33 cm    Triscuspid Valve Regurgitation Peak Gradient 29 mmHg    LA Volume Index (Mod) 56.4 mL/m2    LA volume (mod) 106.00 cm3    RA Width 3.99 cm   ISTAT Lactate   Result Value Ref Range    POC Lactate 5.38 (HH) 0.5 - 2.2 mmol/L    Sample VENOUS    ISTAT PROCEDURE   Result Value Ref Range    POC PH 7.184 (LL) 7.35 - 7.45    POC PCO2 71.0 (HH) 35 - 45 mmHg    POC PO2 330 (H) 80 - 100 mmHg    POC HCO3 26.7 24 - 28 mmol/L    POC BE -2 -2 to 2 mmol/L    POC SATURATED O2 100 95 - 100 %    POC TCO2 29 (H) 23 - 27 mmol/L    Sample ARTERIAL     FiO2 100    ISTAT PROCEDURE   Result Value Ref Range    POC Sodium 141 136 - 145 mmol/L    POC Potassium 3.5 3.5 - 5.1 mmol/L    POC Ionized Calcium 1.13 1.06 - 1.42 mmol/L    POC Hematocrit 48 36 - 54 %PCV    POC HEMOGLOBIN 16 g/dL    Sample VENOUS    ISTAT PROCEDURE   Result Value Ref Range    POC PH 7.297 (L) 7.35 - 7.45    POC PCO2 53.3 (H) 35 - 45 mmHg    POC PO2 82 80 - 100 mmHg    POC HCO3 26.0 24 - 28 mmol/L    POC BE 0 -2 to 2 mmol/L    POC SATURATED O2 94 (L) 95 - 100 %    POC Sodium 142 136 - 145 mmol/L    POC Potassium 2.9 (L) 3.5 - 5.1 mmol/L    POC TCO2 28 (H) 23 - 27 mmol/L    POC Ionized Calcium 1.17 1.06 - 1.42 mmol/L    POC Hematocrit 42 36 - 54 %PCV    POC HEMOGLOBIN 14 g/dL    Rate 24     Sample ARTERIAL     Site RR     Allens Test Pass     DelSys Adult Vent     Mode AC/PRVC     Vt 300     PEEP 5     FiO2 35        Initial Impression  89 y.o. adult brought in my EMS unresponsive with initial suspicion of overdose by EMS. Minimal response to Narcan. On  arrival - patient obtunded with apparent seizure activity. Not protecting airway. Initial resuscitation included IV Ativan and rapid sequence intubation. Patient noted to be severely hypertensive. This is treated with Nicardipine. Broad workup initiated, including labs, toxicology screen, and CT brain.    Differential Dx:  Hypoglycemia, intoxication, CNS abnormality including CVA, TIA, SAH, HTN encephalopathy, cerebral edema, tumor, also includes metabolic causea such as hypo/hyperglycemia, electrolyte abnormality, hypothermia, thyroid disease, hypoxia, hypercarbia, medication side effect, infectious causes including meningitis, encephalitis, UTI, PNA, skin infection, viral syndrome intraabdominal infection, hypoperfusion, psychiatric disorder, migraine, psychosis     MDM:    Patient presented critically ill after being found unresponsive on the street, prolonged EMS response time due to parade traffic.  Per EMS patient was apneic on fire department arrival and respiratory support was give with BVM, narcan administered, patient did appear to have improvement of respiratory effort but remained obtunded on ED arrival.  On arrival to the ED patient with apparent seizure v/s decorticate posturing and lack of airway protection.  Intubated for airway protection, ativan given.  BP also noted to be severely elevated with initial BP of 242/144 and systolic greater than 260 max.  .  Following initial airway management, several lines placed and patient initiated on propofol for seizure protection, nicardipine for management of suspected hypertensive emergency with potential related encephalopathy although BP could also be related to toxicologic cause or acute opiate or etoh withdrawal, or due to infection/sepsis.  Patient hypothermic with central temperature monitoring with lea temperature probe, initial temp 91.2.  Environmental exposure could be contributory, although patient was wearing thick coat.  Lactate >5 initially  thus patient given volume resuscitation and broad spectrum antibiotics.  After stabilization, patient transported to CT and no ICH found with probable remote infarcts.  CXR with good position of ETT and OGT, no apparent infiltrate.  Overall cause of encephalopathy unclear and likely multifactorial, will admit patient to ICU for further management. Unfortunately patient does not have identification thus I was unable to notify friends/family of condition.        This patient does not have evidence of infective focus  My overall impression is  undifferentiated evidence of tissue hypoperfusion .  Source: Unknown  Antibiotics given-   Antibiotics (72h ago, onward)      Start     Stop Route Frequency Ordered    02/13/23 0600  cefepime in dextrose 5 % IVPB 2 g         -- IV Every 12 hours (non-standard times) 02/13/23 0454    02/12/23 2100  mupirocin 2 % ointment         02/17 2059 Nasl 2 times daily 02/12/23 1638    02/12/23 1735  vancomycin - pharmacy to dose  (vancomycin IVPB)        See Hyperspace for full Linked Orders Report.    -- IV pharmacy to manage frequency 02/12/23 1635          Latest lactate reviewed-  Recent Labs   Lab 02/12/23  1414 02/12/23  1825   LACTATE 5.1* 2.5*     Organ dysfunction indicated by Acute kidney injury, Acute respiratory failure, and Encephalopathy  Fluid challenge Actual Body weight- Patient will receive 30ml/kg actual body weight to calculate fluid bolus for treatment of septic shock.   Post- resuscitation assessment Yes Perfusion exam was performed within 6 hours of septic shock presentation after bolus shows Adequate tissue perfusion assessed by non-invasive monitoring   Will Not start Pressors- Levophed for MAP of 65    I discussed the patient with Dr. Gardner from pulmonology/critical care who will evaluate patient.  I discussed the patient's presentation, findings and response to treatment in the ED with the hospitalist on call who will admit to ICU for further treatment and  evaluation.          Scribe Attestation:   Scribe #1: I performed the above scribed service and the documentation accurately describes the services I performed. I attest to the accuracy of the note.    Physician Attestation for Scribe: I, April Michael MD, reviewed documentation as scribed in my presence, which is both accurate and complete.    Diagnostic Impression:    1. Encephalopathy acute    2. Weakness    3. Acute respiratory failure with hypoxia    4. Hypertensive emergency    5. Acute hypercapnic respiratory failure    6. Hypothermia, initial encounter    7. Elevated troponin    8. Polysubstance abuse         ED Disposition Condition    Admit Critical                    April Michael MD  02/13/23 [id number]

## 2023-02-12 NOTE — ASSESSMENT & PLAN NOTE
- Patient met criteria with hypertensive emergency with end organ damage involving the CNS, respiratory, renal, and cardiac systems with systolic greater than 240s  - Encephalopathy likely multifactorial in nature with possible CVA versus seizure vs hypertensive; cannot rule out sepsis versus toxic encephalopathy due to illicit substances given U tox positive for cocaine and marijuana  - Initial head CT without acute findings, but does show remote infarct and microvascular ischemic changes, but CVA has not been completely ruled out at this time  - His hypothermia, could be CNS driven versus septic versus environmental given patient was found down outside for unknown duration of time during winter  - Goal to decrease blood pressure by 25% within the 1st 24 hours, targeting systolic blood pressure of less than 180  - Will continue empiric cefepime and vancomycin for now, follow-up cultures and quickly deescalate if no focus of infection found  - Will need further workup with repeat CT scan of the head or MRI of the brain after stabilization, and check EEG.  No need to start antiepileptic agents at this time given patient is on propofol and will have Ativan p.r.n. for any seizure-like activity  - Further workup with 2D echo and trend troponins and CPK, monitor on telemetry  - Consult Neurology and Pulmonary Critical Care  - Will consider consult to nephrology and Cardiology if renal function does not improve and troponins continue trend upwards

## 2023-02-12 NOTE — CARE UPDATE
Unknown male found down, presenting to ER with concern for seizure, hypertensive emergency and concern for sepsis.    Encephalopathy / ?seizure  - etiology for acute encephalopathy include drug use, exposure, sepsis, seizure, hypertensive crisis  - CT head without acute bleed, possible remote infarcts  - continue propofol for seizure control  - obtain EEG stat  - hold further doses of cefepime  - trend lactic as below    Hypertensive emergency  - continue cardene as ordered  - goal  for next 24 hours, avoid further drops  - start oral meds tomorrow when patient stabilized    Sepsis  - unresponsive, hypothermic, leukocytosis, lactic acidosis, elevated procal  - source not clear based on current workup  - Bcx obtained, follow up final results when available  - jaron cover empirically for now, prefer vanc/zosyn over cefepime use in setting of possible seizure; would try and limit duration to 24-48 hours in setting of concurrent FLORENTINO  - repeat lactic in 4 hours to ensure downtrending after resuscitation    Acute hypercapnic respiratory failure  - in setting of above, patient found unresponsive  - intubated and hyperventilated on vent  - repeat ABG now, further vent adjustments based on results    Lactic acidosis  - in setting of above  - repeat level now to ensure downtrending    FLORENTINO  - in setting of above  - mildly elevated CPK  - s/p IVF resuscitation in ER  - no indication for emergent HD based on current labs  - trend with daily BMP  - vanc/zosyn as above, limit use to next 24-48 hours    Elevated troponin, mild  - unable to see EKG, but reportedly no evidence of ischemia  - has sternotomy scar, so clearly has significant CAD  - start daily ASA and statin  - repeat troponin to ensure downtrending  - obtain TTE    F: NPO for now, start tube feeds tomorrow  A: none  S: propofol for possible seizure  T: start lovenox now (renally dose if needed)  H: 30 degrees  U: no indication for GI prophylaxis now  G: goal CBG  180, currently elevated  S: SBT tomorrow AM  B: assess need for bowel regimen tomorrow AM  I: needs bladder scans or lea cath if not urinating  D: continue vanc/zosyn for now    Please call with further questions or concerns.    Yoan Gardner MD  Pulmonary / Critical Care

## 2023-02-12 NOTE — HPI
"Unknown male was found down in the street.  It was reported that a bystander said his name was "Alejandro" but that person is no longer available to be interviewed.  The fire department reported pinpoint pupils with agonal respiration and he was given Narcan without improvement in his mentation.  On arrival to the hospital, he was noted to be drooling and contracted, and he started having seizure-like activity witnessed by staff.  Physical exam by the ER physician document eye deviation to the left and downward.  He was unable to protect his airway and was intubated.  Noted to be hypothermic with a temperature of 91.6° F, heart rate 131, duration 34, blood pressure 242/144.  Treatment initiated with Cardene drip for blood pressure control and patient was given empiric cefepime and vancomycin after obtaining cultures.  Workup in the ER, remarkable for head CT showed no acute abnormalities but showed "Foci of low attenuation involving the bilateral corona radiata and basal ganglia are suspected to reflect that of remote infarct." U tox remarkable for cocaine and marijuana, BUN and creatinine of 33/2.3, , , troponin 0.049, lactate 5.1 procalcitonin 1.09, serum alcohol negative, WBC 23.42, UA overall not remarkable, chest x-ray without definitive infiltrate, ABG pH 7.184 pCO2 71 PO2 330 bicarb 29.    "

## 2023-02-12 NOTE — ASSESSMENT & PLAN NOTE
- Unknown baseline renal function  - Replace Pan catheter, monitor strict I&Os  - Workup with renal ultrasound and check repeat labs  - Will consider Nephrology consult renal function does not improve

## 2023-02-12 NOTE — CARE UPDATE
02/12/23 1503   PRE-TX-O2   SpO2 100 %   Pulse 97   Resp (!) 24   Temp 96.6 °F (35.9 °C)   BP (!) 158/84   Code Blue/Rapid Response   Respiratory Therapist Present sws   $ Intubation Yes   Intubation Confirmation Auscultation;CO2 Colorimetric Device;Direct Laryngoscopy  (ER MD intubated)   Critical Value Communication   Date Result Received 02/12/23   Time Result Received 1503   Critical Test #1 lactate   Critical Test #1 Result 5.01   Name of Notified Physician/Designee fort   Date Notified 02/12/23   Time Notified 1503

## 2023-02-12 NOTE — NURSING
Call made to tele medicine Neurology. Message left at [phone] as instructed by transfer center.

## 2023-02-13 LAB
AV INDEX (PROSTH): 0.82
AV MEAN GRADIENT: 5 MMHG
AV PEAK GRADIENT: 9 MMHG
AV VALVE AREA: 3.62 CM2
AV VELOCITY RATIO: 0.68
BASOPHILS # BLD AUTO: 0.05 K/UL (ref 0–0.2)
BASOPHILS NFR BLD: 0.4 % (ref 0–1.9)
BSA FOR ECHO PROCEDURE: 1.88 M2
CV ECHO LV RWT: 0.43 CM
DIFFERENTIAL METHOD: ABNORMAL
DOP CALC AO PEAK VEL: 1.46 M/S
DOP CALC AO VTI: 25.2 CM
DOP CALC LVOT AREA: 4.4 CM2
DOP CALC LVOT DIAMETER: 2.37 CM
DOP CALC LVOT PEAK VEL: 1 M/S
DOP CALC LVOT STROKE VOLUME: 91.27 CM3
DOP CALC MV VTI: 6.9 CM
DOP CALCLVOT PEAK VEL VTI: 20.7 CM
E WAVE DECELERATION TIME: 197.69 MSEC
E/A RATIO: 0.74
E/E' RATIO: 16.75 M/S
ECHO LV POSTERIOR WALL: 1.19 CM (ref 0.6–1.1)
EJECTION FRACTION: 22 %
EOSINOPHIL # BLD AUTO: 0 K/UL (ref 0–0.5)
EOSINOPHIL NFR BLD: 0.1 % (ref 0–8)
ERYTHROCYTE [DISTWIDTH] IN BLOOD BY AUTOMATED COUNT: 15.4 % (ref 11.5–14.5)
FRACTIONAL SHORTENING: 10 % (ref 28–44)
HCT VFR BLD AUTO: 38.7 % (ref 40–54)
HGB BLD-MCNC: 13.8 G/DL (ref 14–18)
IMM GRANULOCYTES # BLD AUTO: 0.08 K/UL (ref 0–0.04)
IMM GRANULOCYTES NFR BLD AUTO: 0.6 % (ref 0–0.5)
INTERVENTRICULAR SEPTUM: 1.4 CM (ref 0.6–1.1)
IVC DIAMETER: 18 CM
IVRT: 54.23 MSEC
LA MAJOR: 5.33 CM
LA MINOR: 5.58 CM
LA WIDTH: 5.4 CM
LEFT ATRIUM SIZE: 4.36 CM
LEFT ATRIUM VOLUME INDEX MOD: 56.4 ML/M2
LEFT ATRIUM VOLUME INDEX: 58 ML/M2
LEFT ATRIUM VOLUME MOD: 106 CM3
LEFT ATRIUM VOLUME: 109.11 CM3
LEFT INTERNAL DIMENSION IN SYSTOLE: 4.95 CM (ref 2.1–4)
LEFT VENTRICLE DIASTOLIC VOLUME INDEX: 78.63 ML/M2
LEFT VENTRICLE DIASTOLIC VOLUME: 147.82 ML
LEFT VENTRICLE MASS INDEX: 161 G/M2
LEFT VENTRICLE SYSTOLIC VOLUME INDEX: 61.4 ML/M2
LEFT VENTRICLE SYSTOLIC VOLUME: 115.52 ML
LEFT VENTRICULAR INTERNAL DIMENSION IN DIASTOLE: 5.51 CM (ref 3.5–6)
LEFT VENTRICULAR MASS: 303.58 G
LV LATERAL E/E' RATIO: 13.4 M/S
LV SEPTAL E/E' RATIO: 22.33 M/S
LVOT MG: 2.47 MMHG
LVOT MV: 0.75 CM/S
LYMPHOCYTES # BLD AUTO: 0.9 K/UL (ref 1–4.8)
LYMPHOCYTES NFR BLD: 6.4 % (ref 18–48)
MAGNESIUM SERPL-MCNC: 1.6 MG/DL (ref 1.6–2.6)
MCH RBC QN AUTO: 32 PG (ref 27–31)
MCHC RBC AUTO-ENTMCNC: 35.7 G/DL (ref 32–36)
MCV RBC AUTO: 90 FL (ref 82–98)
MONOCYTES # BLD AUTO: 1 K/UL (ref 0.3–1)
MONOCYTES NFR BLD: 7.6 % (ref 4–15)
MRSA ID BY PCR: NEGATIVE
MV MEAN GRADIENT: 1 MMHG
MV PEAK A VEL: 0.9 M/S
MV PEAK E VEL: 0.67 M/S
MV PEAK GRADIENT: 1 MMHG
MV STENOSIS PRESSURE HALF TIME: 57.33 MS
MV VALVE AREA BY CONTINUITY EQUATION: 13.23 CM2
MV VALVE AREA P 1/2 METHOD: 3.84 CM2
NEUTROPHILS # BLD AUTO: 11.3 K/UL (ref 1.8–7.7)
NEUTROPHILS NFR BLD: 84.9 % (ref 38–73)
NRBC BLD-RTO: 0 /100 WBC
PHOSPHATE SERPL-MCNC: 3.5 MG/DL (ref 2.7–4.5)
PISA MRMAX VEL: 4.93 M/S
PISA TR MAX VEL: 2.67 M/S
PLATELET # BLD AUTO: 186 K/UL (ref 150–450)
PMV BLD AUTO: 12 FL (ref 9.2–12.9)
PULM VEIN S/D RATIO: 1.2
PV PEAK D VEL: 0.41 M/S
PV PEAK S VEL: 0.49 M/S
PV PEAK VELOCITY: 0.9 CM/S
RA MAJOR: 5.05 CM
RA WIDTH: 3.99 CM
RBC # BLD AUTO: 4.31 M/UL (ref 4.6–6.2)
SINUS: 3.44 CM
STAPH AUREUS ID BY PCR: NEGATIVE
TDI LATERAL: 0.05 M/S
TDI SEPTAL: 0.03 M/S
TDI: 0.04 M/S
TR MAX PG: 29 MMHG
TRICUSPID ANNULAR PLANE SYSTOLIC EXCURSION: 1.33 CM
TROPONIN I SERPL DL<=0.01 NG/ML-MCNC: 0.47 NG/ML (ref 0–0.03)
TROPONIN I SERPL DL<=0.01 NG/ML-MCNC: 0.75 NG/ML (ref 0–0.03)
VANCOMYCIN SERPL-MCNC: 10.8 UG/ML
WBC # BLD AUTO: 13.24 K/UL (ref 3.9–12.7)

## 2023-02-13 PROCEDURE — 25000003 PHARM REV CODE 250: Performed by: HOSPITALIST

## 2023-02-13 PROCEDURE — 36415 COLL VENOUS BLD VENIPUNCTURE: CPT | Performed by: HOSPITALIST

## 2023-02-13 PROCEDURE — 27000190 HC CPAP FULL FACE MASK W/VALVE

## 2023-02-13 PROCEDURE — 94761 N-INVAS EAR/PLS OXIMETRY MLT: CPT

## 2023-02-13 PROCEDURE — 84484 ASSAY OF TROPONIN QUANT: CPT | Performed by: SURGERY

## 2023-02-13 PROCEDURE — 93010 EKG 12-LEAD: ICD-10-PCS | Mod: ,,, | Performed by: INTERNAL MEDICINE

## 2023-02-13 PROCEDURE — 93010 ELECTROCARDIOGRAM REPORT: CPT | Mod: ,,, | Performed by: INTERNAL MEDICINE

## 2023-02-13 PROCEDURE — 85025 COMPLETE CBC W/AUTO DIFF WBC: CPT | Performed by: HOSPITALIST

## 2023-02-13 PROCEDURE — 99900035 HC TECH TIME PER 15 MIN (STAT)

## 2023-02-13 PROCEDURE — 99291 PR CRITICAL CARE, E/M 30-74 MINUTES: ICD-10-PCS | Mod: ,,, | Performed by: INTERNAL MEDICINE

## 2023-02-13 PROCEDURE — 99291 CRITICAL CARE FIRST HOUR: CPT | Mod: ,,, | Performed by: HOSPITALIST

## 2023-02-13 PROCEDURE — 93005 ELECTROCARDIOGRAM TRACING: CPT

## 2023-02-13 PROCEDURE — 94660 CPAP INITIATION&MGMT: CPT

## 2023-02-13 PROCEDURE — 99900026 HC AIRWAY MAINTENANCE (STAT)

## 2023-02-13 PROCEDURE — 99291 CRITICAL CARE FIRST HOUR: CPT | Mod: ,,, | Performed by: INTERNAL MEDICINE

## 2023-02-13 PROCEDURE — C9113 INJ PANTOPRAZOLE SODIUM, VIA: HCPCS | Performed by: HOSPITALIST

## 2023-02-13 PROCEDURE — 80053 COMPREHEN METABOLIC PANEL: CPT | Performed by: HOSPITALIST

## 2023-02-13 PROCEDURE — 99291 PR CRITICAL CARE, E/M 30-74 MINUTES: ICD-10-PCS | Mod: ,,, | Performed by: HOSPITALIST

## 2023-02-13 PROCEDURE — 84484 ASSAY OF TROPONIN QUANT: CPT | Mod: 91 | Performed by: HOSPITALIST

## 2023-02-13 PROCEDURE — 63600175 PHARM REV CODE 636 W HCPCS: Performed by: HOSPITALIST

## 2023-02-13 PROCEDURE — 20000000 HC ICU ROOM

## 2023-02-13 PROCEDURE — 27000221 HC OXYGEN, UP TO 24 HOURS

## 2023-02-13 PROCEDURE — 80202 ASSAY OF VANCOMYCIN: CPT | Performed by: HOSPITALIST

## 2023-02-13 PROCEDURE — 84100 ASSAY OF PHOSPHORUS: CPT | Performed by: HOSPITALIST

## 2023-02-13 PROCEDURE — 83735 ASSAY OF MAGNESIUM: CPT | Performed by: HOSPITALIST

## 2023-02-13 PROCEDURE — 25000003 PHARM REV CODE 250: Performed by: NURSE PRACTITIONER

## 2023-02-13 RX ORDER — NICARDIPINE HYDROCHLORIDE 0.2 MG/ML
0-15 INJECTION INTRAVENOUS CONTINUOUS
Status: DISCONTINUED | OUTPATIENT
Start: 2023-02-13 | End: 2023-02-14

## 2023-02-13 RX ORDER — LORAZEPAM 2 MG/ML
2 INJECTION INTRAMUSCULAR
Status: DISCONTINUED | OUTPATIENT
Start: 2023-02-13 | End: 2023-02-17 | Stop reason: HOSPADM

## 2023-02-13 RX ORDER — CLONIDINE 0.2 MG/24H
1 PATCH, EXTENDED RELEASE TRANSDERMAL
Status: DISCONTINUED | OUTPATIENT
Start: 2023-02-13 | End: 2023-02-17 | Stop reason: HOSPADM

## 2023-02-13 RX ORDER — HYDRALAZINE HYDROCHLORIDE 20 MG/ML
20 INJECTION INTRAMUSCULAR; INTRAVENOUS EVERY 8 HOURS PRN
Status: DISCONTINUED | OUTPATIENT
Start: 2023-02-13 | End: 2023-02-16

## 2023-02-13 RX ORDER — CEFEPIME HYDROCHLORIDE 1 G/50ML
2 INJECTION, SOLUTION INTRAVENOUS
Status: DISCONTINUED | OUTPATIENT
Start: 2023-02-13 | End: 2023-02-15

## 2023-02-13 RX ORDER — HYDRALAZINE HYDROCHLORIDE 20 MG/ML
10 INJECTION INTRAMUSCULAR; INTRAVENOUS EVERY 6 HOURS PRN
Status: DISCONTINUED | OUTPATIENT
Start: 2023-02-13 | End: 2023-02-13

## 2023-02-13 RX ADMIN — VANCOMYCIN HYDROCHLORIDE 1000 MG: 1 INJECTION, POWDER, LYOPHILIZED, FOR SOLUTION INTRAVENOUS at 06:02

## 2023-02-13 RX ADMIN — FAMOTIDINE 20 MG: 10 INJECTION, SOLUTION INTRAVENOUS at 08:02

## 2023-02-13 RX ADMIN — CLONIDINE 1 PATCH: 0.2 PATCH TRANSDERMAL at 02:02

## 2023-02-13 RX ADMIN — HYDRALAZINE HYDROCHLORIDE 20 MG: 20 INJECTION INTRAMUSCULAR; INTRAVENOUS at 02:02

## 2023-02-13 RX ADMIN — MUPIROCIN: 20 OINTMENT TOPICAL at 08:02

## 2023-02-13 RX ADMIN — PANTOPRAZOLE SODIUM 40 MG: 40 INJECTION, POWDER, FOR SOLUTION INTRAVENOUS at 10:02

## 2023-02-13 RX ADMIN — NICARDIPINE HYDROCHLORIDE 2.5 MG/HR: 0.2 INJECTION, SOLUTION INTRAVENOUS at 06:02

## 2023-02-13 RX ADMIN — HEPARIN SODIUM 5000 UNITS: 5000 INJECTION INTRAVENOUS; SUBCUTANEOUS at 02:02

## 2023-02-13 RX ADMIN — PROPOFOL 20 MCG/KG/MIN: 10 INJECTION, EMULSION INTRAVENOUS at 06:02

## 2023-02-13 RX ADMIN — HEPARIN SODIUM 5000 UNITS: 5000 INJECTION INTRAVENOUS; SUBCUTANEOUS at 05:02

## 2023-02-13 RX ADMIN — LORAZEPAM 2 MG: 2 INJECTION INTRAMUSCULAR; INTRAVENOUS at 10:02

## 2023-02-13 RX ADMIN — HYDRALAZINE HYDROCHLORIDE 10 MG: 20 INJECTION INTRAMUSCULAR; INTRAVENOUS at 10:02

## 2023-02-13 RX ADMIN — CHLORHEXIDINE GLUCONATE 0.12% ORAL RINSE 15 ML: 1.2 LIQUID ORAL at 08:02

## 2023-02-13 RX ADMIN — MUPIROCIN: 20 OINTMENT TOPICAL at 10:02

## 2023-02-13 RX ADMIN — CEFEPIME HYDROCHLORIDE 2 G: 2 INJECTION, SOLUTION INTRAVENOUS at 05:02

## 2023-02-13 RX ADMIN — CEFEPIME HYDROCHLORIDE 2 G: 2 INJECTION, SOLUTION INTRAVENOUS at 06:02

## 2023-02-13 RX ADMIN — HEPARIN SODIUM 5000 UNITS: 5000 INJECTION INTRAVENOUS; SUBCUTANEOUS at 10:02

## 2023-02-13 RX ADMIN — CHLORHEXIDINE GLUCONATE 0.12% ORAL RINSE 15 ML: 1.2 LIQUID ORAL at 10:02

## 2023-02-13 NOTE — ASSESSMENT & PLAN NOTE
- Intubated to protect airway  - Consult placed to Pulmonary Critical Care  - SBT in progress and given mental status improvement, possible extubation later today

## 2023-02-13 NOTE — ASSESSMENT & PLAN NOTE
- Patient met criteria with hypertensive emergency with end organ damage involving the CNS, respiratory, renal, and cardiac systems with systolic greater than 240s  - Encephalopathy likely multifactorial in nature with possible CVA versus seizure vs hypertensive; cannot rule out sepsis versus toxic encephalopathy due to illicit substances given U tox positive for cocaine and marijuana  - Initial head CT without acute findings, but does show remote infarct and microvascular ischemic changes, but CVA has not been completely ruled out at this time  - His hypothermia, could be CNS driven versus septic versus environmental given patient was found down outside for unknown duration of time during winter  - Goal to decrease blood pressure by 25% within the 1st 24 hours, targeting systolic blood pressure of less than 180  - Continue empiric cefepime and vancomycin for now, follow-up cultures and quickly deescalate if no focus of infection found and blood cultures remain negative  - Considered further workup with repeat CT scan of the head or MRI of the brain, and EEG after stabilization, but neurological exam with significant improvement today and he may not need further imaging or EEG.  No need to start antiepileptic agents at this time given patient is on propofol and will have Ativan p.r.n., and he has displayed no further seizure-like activity.  - Further workup with 2D echo and trend troponins and CPK in progress, monitor on telemetry  - Consult to Neurology and Pulmonary Critical Care pending  - Weaned off Cardene drip as of this morning, add IV antihypertensive p.r.n.  - Will consider starting p.o. regimen if patient is extubated later today

## 2023-02-13 NOTE — PROGRESS NOTES
Pharmacokinetic Assessment Follow Up: IV Vancomycin    Vancomycin serum concentration assessment(s):    The random level was drawn correctly and can be used to guide therapy at this time. The measurement is within the desired definitive target range of 10 to 20 mcg/mL.    Vancomycin Regimen Plan:    Give one time 15 mg/kg (1000 mg) dose now. Re-dose when the random level is less than 20 mcg/mL, next level to be drawn at 1630 on 2/14/23    Drug levels (last 3 results):  Recent Labs   Lab Result Units 02/13/23  1435   Vancomycin, Random ug/mL 10.8       Pharmacy will continue to follow and monitor vancomycin.    Please contact pharmacy at extension [phone] for questions regarding this assessment.    Thank you for the consult,   Olga Farias       Patient brief summary:  Migdalia Dickerson is a 62 y.o. male initiated on antimicrobial therapy with IV Vancomycin for treatment of bacteremia    The patient's current regimen is pulse dosing    Drug Allergies:   Review of patient's allergies indicates:  Not on File    Actual Body Weight:   71.2 kg    Renal Function:   Estimated Creatinine Clearance: 49.4 mL/min (A) (based on SCr of 1.6 mg/dL (H)).,     Dialysis Method (if applicable):  N/A    CBC (last 72 hours):  Recent Labs   Lab Result Units 02/12/23  1414 02/13/23  0336   WBC K/uL 23.42* 13.24*   Hemoglobin g/dL 15.0 13.8*   Hematocrit % 45.3 38.7*   Platelets K/uL 258 186   Gran % % 79.0* 84.9*   Lymph % % 8.0* 6.4*   Mono % % 9.0 7.6   Eosinophil % % 0.0 0.1   Basophil % % 0.0 0.4   Differential Method  Manual Automated       Metabolic Panel (last 72 hours):  Recent Labs   Lab Result Units 02/12/23  1414 02/12/23  1423 02/13/23  0337   Sodium mmol/L 142  --  139   Potassium mmol/L 3.6  --  3.6   Chloride mmol/L 104  --  105   CO2 mmol/L 20*  --  22*   Glucose mg/dL 232*  --  83   Glucose, UA   --  2+*  --    BUN mg/dL 33*  --  26*   Creatinine mg/dL 2.3*  --  1.6*   Creatinine, Urine mg/dL  --  33.4  --    Albumin  g/dL 4.3  --  3.4*   Total Bilirubin mg/dL 1.1*  --  0.9   Alkaline Phosphatase U/L 83  --  59   AST U/L 35  --  33   ALT U/L 23  --  19   Magnesium mg/dL 2.2  --  1.6   Phosphorus mg/dL  --   --  3.5       Vancomycin Administrations:  vancomycin given in the last 96 hours                     vancomycin 2 g in dextrose 5 % 500 mL IVPB (mg) 2,000 mg New Bag 02/12/23 7857                    Microbiologic Results:  Microbiology Results (last 7 days)       Procedure Component Value Units Date/Time    Blood culture x two cultures. Draw prior to antibiotics. [[id number]] Collected: 02/12/23 1416    Order Status: Completed Specimen: Blood from Wrist, Right Updated: 02/13/23 0515     Blood Culture, Routine No Growth to date    Narrative:      Aerobic and anaerobic    Blood culture x two cultures. Draw prior to antibiotics. [[id number]] Collected: 02/12/23 1418    Order Status: Completed Specimen: Blood from Peripheral, Jugular, External Right Updated: 02/13/23 0515     Blood Culture, Routine No Growth to date    Narrative:      Aerobic and anaerobic

## 2023-02-13 NOTE — NURSING
's/100, gave PRN 10mg Hydralazine IVP. Reassessed BP now 200-2'2/100-110. Notified Dr. Rosenberg, 20mg Hyralazine IVP given per MD, pressure now 181/109. Clonidine patch ordered as well.

## 2023-02-13 NOTE — CARE UPDATE
02/13/23 0815   Patient Assessment/Suction   Level of Consciousness (AVPU) responds to voice   Respiratory Effort Normal   Expansion/Accessory Muscles/Retractions no retractions;no use of accessory muscles;expansion symmetric   All Lung Fields Breath Sounds clear;equal bilaterally   HAILY Breath Sounds clear   LLL Breath Sounds clear   RUL Breath Sounds clear   RML Breath Sounds clear   RLL Breath Sounds clear   Rhythm/Pattern, Respiratory assisted mechanically   Cough Frequency frequent;with stimulation   Cough Type good;productive;assisted   Suction Method tracheal;oral   Secretions Amount moderate   Secretions Color tan   Secretions Characteristics thin   Skin Integrity   $ Wound Care Tech Time 15 min   Area Observed Left;Right;Behind ear;Cheek;Bridge of nose;Upper lip;Nares;Neck   Skin Appearance without discoloration   PRE-TX-O2   Device (Oxygen Therapy) ventilator   Oxygen Concentration (%) 35   SpO2 100 %   Pulse Oximetry Type Continuous   Pulse 76   Resp (!) 24   Temp 99.1 °F (37.3 °C)        Airway - Non-Surgical 02/12/23 Endotracheal Tube   Placement Date: 02/12/23   Present Prior to Hospital Arrival?: No  Method of Intubation: Glidescope;Rapid Sequence Induction  Inserted by: MD  Staff/Resident Name(s): rafa  Airway Device: Endotracheal Tube  Intubated: Postinduction  Airway Device Size...   Secured at 27 cm   Measured At Lips   Secured Location Right  (rotated ett to center)   Secured by Commercial tube jones   Site Condition Cool;Dry   Status Intact;Secured;Patent   Site Assessment Clean;Dry;No bleeding   Airway Safety   Ambu bag with the patient? Yes, Adult Ambu   Is mask with the patient? Yes, Adult Mask   ETT Size 7.5   Vent Select   Conventional Vent Y   Charged w/in last 24h YES   Preset Conventional Ventilator Settings   Vent Type    Ventilation Type VC   Vent Mode A/C   Set Rate 24 BPM   Vt Set 350 mL   PEEP/CPAP 5 cmH20   Peak Flow 44 L/min   Plateau Set/Insp. Hold (sec) 0   I-Trigger  "Type  V-TRIG   Trigger Sensitivity Flow/I-Trigger 3 L/min   Patient Ventilator Parameters   Resp Rate Total 24 br/min   Peak Airway Pressure 19 cmH20   Mean Airway Pressure 10 cmH20   Plateau Pressure 0 cmH20   Exhaled Vt 322 mL   Total Ve 8.18 L/m   I:E Ratio Measured 1:1.90   Auto PEEP 0 cmH20   Conventional Ventilator Alarms   Alarms On Y   Ve High Alarm 30 L/min   Ve Low Alarm 2 L/min   Resp Rate High Alarm 45 br/min   Press High Alarm 50 cmH2O   Apnea Rate 10   Apnea Volume (mL) 300 mL   Apnea Oxygen Concentration  100   Apnea Flow Rate (L/min) 44   T Apnea 20 sec(s)   IHI Ventilator Associated Pneumonia Bundle (Required)   Daily Awakening Trials Performed   (When Pulm Crit rounds)   Head of Bed Elevated  HOB 45   Oral Care CHG;Teeth brushed   Vent Circut Breaks Minimized Yes   Ready to Wean/Extubation Screen   FIO2<=50 (chart decimal) 0.35   MV<16L (chart vol.) 8.18   PEEP <=8 (chart #) 5   Ready to Wean Parameters   F/VT Ratio<105 (RSBI) (!) 74.53   Negative Inspiratory Force   Negative Inspiratory Force (cm H2O) 0   Vital Capacity   Vital Capacity (mL) 0   IBW/VT Calculations   Height 5' 8" (1.727 m)     "

## 2023-02-13 NOTE — ASSESSMENT & PLAN NOTE
Patient off Cardene drip with PRN pushes when needed.  - PRN IV antihypertensives until he passes a swallow study

## 2023-02-13 NOTE — PROGRESS NOTES
"[facility], [place] Intensive Care [facility] Medicine  Progress Note    Patient Name: Migdalia Dickerson  MRN: [id number]  Patient Class: IP- Inpatient   Admission Date: 2/12/2023  Length of Stay: 1 days  Attending Physician: Shilpi Rosenberg MD  Primary Care Provider: No primary care provider on file.        Subjective:     Principal Problem:Hypertensive emergency        HPI:  Unknown male was found down in the street.  It was reported that a bystander said his name was "Alejandro" but that person is no longer available to be interviewed.  The fire department reported pinpoint pupils with agonal respiration and he was given Narcan without improvement in his mentation.  On arrival to the hospital, he was noted to be drooling and contracted, and he started having seizure-like activity witnessed by staff.  Physical exam by the ER physician document eye deviation to the left and downward.  He was unable to protect his airway and was intubated.  Noted to be hypothermic with a temperature of 91.6° F, heart rate 131, duration 34, blood pressure 242/144.  Treatment initiated with Cardene drip for blood pressure control and patient was given empiric cefepime and vancomycin after obtaining cultures.  Workup in the ER, remarkable for head CT showed no acute abnormalities but showed "Foci of low attenuation involving the bilateral corona radiata and basal ganglia are suspected to reflect that of remote infarct." U tox remarkable for cocaine and marijuana, BUN and creatinine of 33/2.3, , , troponin 0.049, lactate 5.1 procalcitonin 1.09, serum alcohol negative, WBC 23.42, UA overall not remarkable, chest x-ray without definitive infiltrate, ABG pH 7.184 pCO2 71 PO2 330 bicarb 29.        Overview/Hospital Course:  Unknown male admitted with hypertensive emergency with encephalopathy, acute respiratory failure and acute renal failure with unknown baseline.  Intubated for airway protection, U tox positive for cocaine and " marijuana, initial head CT with no acute finding but possible remote infarct and microvascular ischemic changes.  Initial concern for seizure-like activity reported in the ER and he was noted to be hypothermic.  Empirically started on cefepime and vancomycin, Cardene drip pressure control.      Interval History:  No acute events, warmed, staff report patient with spontaneous movement in all extremities overnight.  Cardene drip stopped this morning, and propofol sedation decreased.  Patient able to respond to verbal stimulus, maintain gaze and squeeze hand when asked.  SBT trial in progress.  On minimal vent settings.    Review of Systems   Unable to perform ROS: Intubated   Objective:     Vital Signs (Most Recent):  Temp: 99.3 °F (37.4 °C) (02/13/23 0415)  Pulse: 77 (02/13/23 0415)  Resp: (!) 24 (02/13/23 0415)  BP: (!) 170/92 (02/13/23 0345)  SpO2: 97 % (02/13/23 0415)   Vital Signs (24h Range):  Temp:  [91.6 °F (33.1 °C)-100.6 °F (38.1 °C)] 99.3 °F (37.4 °C)  Pulse:  [] 77  Resp:  [8-36] 24  SpO2:  [96 %-100 %] 97 %  BP: (110-287)/() 170/92     Weight: 71.4 kg (157 lb 6.5 oz)  There is no height or weight on file to calculate BMI.    Intake/Output Summary (Last 24 hours) at 2/13/2023 0729  Last data filed at 2/13/2023 0605  Gross per 24 hour   Intake 5731.62 ml   Output 3675 ml   Net 2056.62 ml      Physical Exam  Vitals and nursing note reviewed.   Constitutional:       Comments: Disheveled, thin, likely sedated   HENT:      Head: Normocephalic.      Comments: ET tube in place     Nose: Nose normal.   Eyes:      Conjunctiva/sclera: Conjunctivae normal.   Cardiovascular:      Rate and Rhythm: Normal rate and regular rhythm.      Comments: Healed sternotomy scar with noted erythema mid chest  Pulmonary:      Comments: Coarse ventilated breath sounds, no wheezing  Abdominal:      General: There is no distension.      Palpations: Abdomen is soft.      Tenderness: There is no abdominal tenderness. There is  no guarding or rebound.      Comments: Hypoactive bowel sounds   Musculoskeletal:      Right lower leg: No edema.      Left lower leg: No edema.   Skin:     General: Skin is dry.      Capillary Refill: Capillary refill takes 2 to 3 seconds.   Neurological:      Comments: Response to verbal and tactile stimulus.  Turns to voice and can track, follows simple commands (squeeze hands when asked), spontaneous moving all extremities   Psychiatric:      Comments: Sedated on vent       Significant Labs: All pertinent labs within the past 24 hours have been reviewed.    Significant Imaging: I have reviewed all pertinent imaging results/findings within the past 24 hours.      Assessment/Plan:      * Hypertensive emergency  - Patient met criteria with hypertensive emergency with end organ damage involving the CNS, respiratory, renal, and cardiac systems with systolic greater than 240s  - Encephalopathy likely multifactorial in nature with possible CVA versus seizure vs hypertensive; cannot rule out sepsis versus toxic encephalopathy due to illicit substances given U tox positive for cocaine and marijuana  - Initial head CT without acute findings, but does show remote infarct and microvascular ischemic changes, but CVA has not been completely ruled out at this time  - His hypothermia, could be CNS driven versus septic versus environmental given patient was found down outside for unknown duration of time during winter  - Goal to decrease blood pressure by 25% within the 1st 24 hours, targeting systolic blood pressure of less than 180  - Continue empiric cefepime and vancomycin for now, follow-up cultures and quickly deescalate if no focus of infection found and blood cultures remain negative  - Considered further workup with repeat CT scan of the head or MRI of the brain, and EEG after stabilization, but neurological exam with significant improvement today and he may not need further imaging or EEG.  No need to start  antiepileptic agents at this time given patient is on propofol and will have Ativan p.r.n., and he has displayed no further seizure-like activity.  - Further workup with 2D echo and trend troponins and CPK in progress, monitor on telemetry  - Consult to Neurology and Pulmonary Critical Care pending  - Weaned off Cardene drip as of this morning, add IV antihypertensive p.r.n.  - Will consider starting p.o. regimen if patient is extubated later today    Encephalopathy, metabolic  - As discussed above    Acute hypercapnic respiratory failure  - Intubated to protect airway  - Consult placed to Pulmonary Critical Care  - SBT in progress and given mental status improvement, possible extubation later today    Acute renal failure  - Unknown baseline renal function  - Replace Pan catheter, monitor strict I&Os  - Creatinine was 2.3 on admit, now 1.6  - Workup with renal ultrasound pending  - Continue monitor with repeat labs    Elevated troponin  Elevated BNP  - As discussed above  - Will trend and check ECHO, clinically appears dry    Leukocytosis  - As above, improved today  - Will continue to trend    Hypothermia  - As discussed above  - Warming completed    Metabolic acidosis  - As discussed above    Polysubstance abuse  - UTox positive for cocaine and marijuana      VTE Risk Mitigation (From admission, onward)         Ordered     heparin (porcine) injection 5,000 Units  Every 8 hours         02/12/23 1602     IP VTE HIGH RISK PATIENT  Once         02/12/23 1602     Place sequential compression device  Until discontinued         02/12/23 1602                Critical care time spent on the evaluation and treatment of severe organ dysfunction, review of pertinent labs and imaging studies, discussions with consulting providers and discussions with patient/family: 45  minutes.        Shilpi Rosenberg MD  Department of Hospital Medicine   [facility] - Intensive Care [facility]

## 2023-02-13 NOTE — NURSING
EKG showing st elevation, bigeminy and multifocal Pvc's, notified md, 12 lead done, showing Stach with PVC's, pulm crit notified.

## 2023-02-13 NOTE — ASSESSMENT & PLAN NOTE
Patient with unknown history and identifying information. He has had a CABG in the past with an enlarged cardiac silhouette concerning for a least CAD and likely HFrEF. Troponins increased from .05 to 0.7 with initial EKG showing t-wave inversions in inferior and lateral leads (unclear if these are new or old changes). Will repeat EKG with this increase in troponin as well as repeat troponin to monitor progression. Cocaine positive on admission.  - repeat Troponin  - repeat EKG  - follow up TTE  - consider cardiology consult based on these results

## 2023-02-13 NOTE — ASSESSMENT & PLAN NOTE
- Unknown baseline renal function  - Replace Pan catheter, monitor strict I&Os  - Creatinine was 2.3 on admit, now 1.6  - Workup with renal ultrasound pending  - Continue monitor with repeat labs

## 2023-02-13 NOTE — CONSULTS
[facility] - Intensive Care ([place])  Adult Nutrition  Consult Note    SUMMARY     Recommendations  1) Advance diet as medically able to clear liquid diet - add Boost Breeze TID while on CLD    2) If unable to advance diet, initiate EN: via NG/OG Impact Peptide 1.5 @ 10 mL adv by 10 mL q4-6h until goal rate of 45 mL/hr is met,   Hold for residuals > 400 mL.  mL H20 q4h, additional fluids to be determined by MD   Provides 1620 kcal, 102 g P, 151 g C, 832 mL total H20    3) RD to follow up  Goals:   Patient to have a means to nutrition by RD follow up  Monitored labs to trend toward target ranges    Nutrition Goal Status: new  Communication of RD Recs:  (POC)    Assessment and Plan  Nutrition Problem  Inadequate oral intake    Related to (etiology):   NPO    Signs and Symptoms (as evidenced by):   Intubated/sedated    Interventions:  Collaboration with other providers  Modify rate of EN    Nutrition Diagnosis Status:   New      Malnutrition Assessment   FAWN patient intubated/sedated                                    Reason for Assessment  Reason For Assessment: consult, new tube feeding (Nutrition recommendations)  Diagnosis:  (Hypertensive emergency)  Relevant Medical History:   Patient Active Problem List   Diagnosis    Hypertensive emergency    Acute hypercapnic respiratory failure    Encephalopathy, metabolic    Acute renal failure    Polysubstance abuse    Metabolic acidosis    Hypothermia    Leukocytosis    Elevated troponin    Elevated brain natriuretic peptide (BNP) level     Interdisciplinary Rounds: did not attend  General Information Comments:   2/13: patient found unresponsive by EMS, currently in ICU intubated/sedated. Patient is NPO, with possible extubation today. Mickey 13- PIV x 4, NG/OGT in place. FAWN NFPE patient intubated, will reassess at next follow up.    Nutrition Discharge Planning: dc TBD    Nutrition Risk Screen  Nutrition Risk Screen: tube feeding or parenteral  "nutrition    Nutrition/Diet History  Factors Affecting Nutritional Intake: NPO, on mechanical ventilation    Anthropometrics  Temp: 99.7 °F (37.6 °C)  Height Method: Estimated  Height: 5' 10" (177.8 cm)  Height (inches): 70 in  Weight Method: Bed Scale  Weight: 71.2 kg (156 lb 15.5 oz)  Weight (lb): 156.97 lb  BMI (Calculated): 22.5  BMI Grade: 18.5-24.9 - normal       Lab/Procedures/Meds  Pertinent Labs Reviewed: reviewed  CBC:  Recent Labs   Lab 02/13/23  0336   WBC 13.24*   HGB 13.8*   HCT 38.7*        CMP:  Recent Labs   Lab 02/13/23  0337   CALCIUM 8.7   ALBUMIN 3.4*   PROT 7.7      K 3.6   CO2 22*      BUN 26   CREATININE 1.6*   ALKPHOS 59   ALT 19   AST 33   BILITOT 0.9     Pertinent Medications Reviewed: reviewed  Scheduled Meds:   ceFEPime (MAXIPIME) IVPB  2 g Intravenous Q12H    chlorhexidine  15 mL Mouth/Throat BID    famotidine (PF)  20 mg Intravenous QHS    heparin (porcine)  5,000 Units Subcutaneous Q8H    mupirocin   Nasal BID    pantoprazole  40 mg Intravenous Daily     Continuous Infusions:  PRN Meds:.hydrALAZINE, lorazepam, Pharmacy to dose Vancomycin consult **AND** vancomycin - pharmacy to dose    Estimated/Assessed Needs  Weight Used For Calorie Calculations: 71.2 kg (156 lb 15.5 oz)  Energy Calorie Requirements (kcal): 1595  Energy Need Method: [facility]  Protein Requirements: 71-86 (1.0-1.2 g/kg)  Weight Used For Protein Calculations: 71.2 kg (156 lb 15.5 oz)  Fluid Requirements (mL): 1 mL/kcal  Estimated Fluid Requirement Method:  (or per MD)  RDA Method (mL): 1595  CHO Requirement: 199g    Nutrition Prescription Ordered  Current Diet Order: NPO    Evaluation of Received Nutrient/Fluid Intake  Lipid Calories (kcals): 253 kcals (propofol 9.6 mL/hr)  I/O: + 2 L since admit  Energy Calories Required: not meeting needs  Protein Required: not meeting needs  Fluid Required: not meeting needs  Comments: LBM 2/10  % Intake of Estimated Energy Needs: 0 - 25 %  % Meal Intake: " NPO  Intake/Output - Last 3 Shifts         02/11 0700  02/12 0659 02/12 0700  02/13 0659 02/13 0700 02/14 0659    I.V. (mL/kg)  2781.6 (39)     IV Piggyback  2950     Total Intake(mL/kg)  5731.6 (80.3)     Urine (mL/kg/hr)  3675     Total Output  3675     Net  +2056.6                  Nutrition Risk  Level of Risk/Frequency of Follow-up:  (1-2 x/week)     Monitor and Evaluation  Food and Nutrient Intake: enteral nutrition intake  Food and Nutrient Adminstration: enteral and parenteral nutrition administration  Physical Activity and Function: nutrition-related ADLs and IADLs  Anthropometric Measurements: weight, weight change, body mass index  Biochemical Data, Medical Tests and Procedures: electrolyte and renal panel, gastrointestinal profile, glucose/endocrine profile, inflammatory profile, lipid profile  Nutrition-Focused Physical Findings: overall appearance     Nutrition Follow-Up  RD Follow-up?: Yes  Stacey Epstein, Registration Eligible, Provisional LDN

## 2023-02-13 NOTE — HOSPITAL COURSE
Admitted as unknown male with hypertensive emergency with acute encephalopathy, respiratory failure, and acute kidney injury. Intubated for airway protection with Utox positive for cocaine and marijuana. CT Head was performed showing no acute abnormality but possible remote infarct and microvascular ischemic changes. Initially concern for seizure-like activity in ER and noted to be hypothermic; started empiric antibiotic therapy and nicardipine gtt. Extubated to BiPAP 02/13. Mentation gradually improved and was able to report his name as Alejandro Farrar. Family located and reported h/o stroke and ?dementia. Renal function improved and blood pressure stabilized with PO antihypertensives. Therapy services recommended SNF; patient and family wished to go home with home health instead. With clinical improvement and vital stability, he was prepared for discharge home with home health and outpatient PCP and neuropsychology follow-up.

## 2023-02-13 NOTE — ASSESSMENT & PLAN NOTE
Patient with Hypercapnic Respiratory failure which is Acute.  he is not on home oxygen. Supplemental oxygen was provided and noted- Vent Mode: A/C  Oxygen Concentration (%):  [] 30  Resp Rate Total:  [22 br/min-34 br/min] 22 br/min  Vt Set:  [300 mL-350 mL] 350 mL  PEEP/CPAP:  [5 cmH20] 5 cmH20  Mean Airway Pressure:  [9.6 ohU22-52 cmH20] 10 cmH20.   Signs/symptoms of respiratory failure include- respiratory distress. Contributing diagnoses includes - Drug overdose Labs and images were reviewed. Patient Has recent ABG, which has been reviewed. Will treat underlying causes and adjust management of respiratory failure as follows    Patient with some hypercapnic respiratory failure, seizure like activity, and inability to protect his airway. UDS positive for THC and cocaine with pinpoint pupils that did not respond to Narcan. This morning, the patient passed his SBT trial after sedation was held and he was extubated successfully to BiPAP. Will continue on BiPAP for a few hours. May need more BiPAP at night if he remains intermittently somnolent.  - extubated 2/13 AM to BiPAP  - continue BiPAP nightly if needed  - will place on NC if needed during the day

## 2023-02-13 NOTE — CARE UPDATE
02/13/23 1128   PRE-TX-O2   SpO2 100 %   Pulse 99   Resp (!) 23   Temp 99.7 °F (37.6 °C)        Airway - Non-Surgical 02/12/23 Endotracheal Tube   Placement Date: 02/12/23   Present Prior to Hospital Arrival?: No  Method of Intubation: Glidescope;Rapid Sequence Induction  Inserted by: MD  Staff/Resident Name(s): fort  Airway Device: Endotracheal Tube  Intubated: Postinduction  Airway Device Size...   Status Discontinued   Preset CPAP/BiPAP Settings   Mode Of Delivery BiPAP S/T   $ CPAP/BiPAP Daily Charge BiPAP/CPAP Daily   $ Initial CPAP/BiPAP Setup? Yes   $ Is patient using? Yes   Size of Mask Large   Sized Appropriately? Yes   Equipment Type V60   Airway Device Type large full face mask   Ipap 10   EPAP (cm H2O) 5   Pressure Support (cm H2O) 5   Set Rate (Breaths/Min) 10   ITime (sec) 0.9   Rise Time (sec) 3   Patient CPAP/BiPAP Settings   RR Total (Breaths/Min) 21   Tidal Volume (mL) 507   VE Minute Ventilation (L/min) 10.6 L/min   Peak Inspiratory Pressure (cm H2O) 10   TiTOT (%) 38   Total Leak (L/Min) 21   Patient Trigger - ST Mode Only (%) 99   CPAP/BiPAP Alarms   High Pressure (cm H2O) 40   Low Pressure (cm H2O) 4   Minute Ventilation (L/Min) 1.5   High RR (breaths/min) 45   Low RR (breaths/min) 4

## 2023-02-13 NOTE — PLAN OF CARE
Pt had an uneventful shift. Call to MD Gardner re: Nircardipene infusion early in shift, He stated he wanted infusion ceased when normotensive, and no pressers added to maintain MAP>130.  Ceased as per MAR  @ ~ 0200, Pt briefly obeyed commands? Propofol briefly ceased @ 0500, Pt Moving upper limbs, biting on ETT, eyes spontaneously opened, Sedation recommenced and pt settled   PIVC x 4 Patent. Dressings intact, No acute changes overnight, see Epic Flow charts for further information

## 2023-02-13 NOTE — CONSULTS
"[facility] - Intensive Care ([place])  Pulmonology  Consult Note    Patient Name: Migdalia Dickerson  MRN: [id number]  Admission Date: 2/12/2023  Hospital Length of Stay: 1 days  Code Status: No Order  Attending Physician: Shilpi Rosenberg MD  Primary Care Provider: No primary care provider on file.   Principal Problem: Hypertensive emergency    Inpatient consult to Critical Care Medicine  Consult performed by: Justin Ellerman, MD  Consult ordered by: Shilpi Rosenberg MD        Subjective:     HPI:  Per admitting physician:  "Unknown male was found down in the street.  It was reported that a bystander said his name was "Alejandro" but that person is no longer available to be interviewed.  The fire department reported pinpoint pupils with agonal respiration and he was given Narcan without improvement in his mentation.  On arrival to the hospital, he was noted to be drooling and contracted, and he started having seizure-like activity witnessed by staff.  Physical exam by the ER physician document eye deviation to the left and downward.  He was unable to protect his airway and was intubated.  Noted to be hypothermic with a temperature of 91.6° F, heart rate 131, duration 34, blood pressure 242/144.  Treatment initiated with Cardene drip for blood pressure control and patient was given empiric cefepime and vancomycin after obtaining cultures.  Workup in the ER, remarkable for head CT showed no acute abnormalities but showed "Foci of low attenuation involving the bilateral corona radiata and basal ganglia are suspected to reflect that of remote infarct." U tox remarkable for cocaine and marijuana, BUN and creatinine of 33/2.3, , , troponin 0.049, lactate 5.1 procalcitonin 1.09, serum alcohol negative, WBC 23.42, UA overall not remarkable, chest x-ray without definitive infiltrate, ABG pH 7.184 pCO2 71 PO2 330 bicarb 29."    Patient was intubated and sedated upon arrival to the ED so all information was gathered from " previous recorded notes and the ICU staff as well as the primary physician.       No past medical history on file.    No past surgical history on file.    Review of patient's allergies indicates:  Not on File    Family History    None       Tobacco Use    Smoking status: Not on file    Smokeless tobacco: Not on file   Substance and Sexual Activity    Alcohol use: Not on file    Drug use: Not on file    Sexual activity: Not on file         Review of Systems   Unable to perform ROS: Intubated   Objective:     Vital Signs (Most Recent):  Temp: 99 °F (37.2 °C) (02/13/23 1430)  Pulse: 102 (02/13/23 1430)  Resp: 20 (02/13/23 1430)  BP: (!) 208/106 (02/13/23 1430)  SpO2: 100 % (02/13/23 1430)   Vital Signs (24h Range):  Temp:  [96.4 °F (35.8 °C)-100.6 °F (38.1 °C)] 99 °F (37.2 °C)  Pulse:  [] 102  Resp:  [8-27] 20  SpO2:  [96 %-100 %] 100 %  BP: (110-220)/() 208/106     Weight: 71.2 kg (156 lb 15.5 oz)  Body mass index is 22.52 kg/m².      Intake/Output Summary (Last 24 hours) at 2/13/2023 1434  Last data filed at 2/13/2023 0605  Gross per 24 hour   Intake 5731.62 ml   Output 3675 ml   Net 2056.62 ml       Physical Exam  Vitals and nursing note reviewed.   Constitutional:       Appearance: He is normal weight. He is ill-appearing.      Comments: Following verbal commands off sedation   HENT:      Head: Normocephalic.      Mouth/Throat:      Mouth: Mucous membranes are moist.      Pharynx: Oropharynx is clear.   Eyes:      General: No scleral icterus.     Conjunctiva/sclera: Conjunctivae normal.   Cardiovascular:      Rate and Rhythm: Regular rhythm. Tachycardia present.      Heart sounds: Murmur heard.      Comments: Large sternotomy scar noted  Pulmonary:      Effort: Pulmonary effort is normal. No respiratory distress.      Breath sounds: No wheezing, rhonchi or rales.   Abdominal:      Palpations: Abdomen is soft.   Musculoskeletal:      Right lower leg: No edema.      Left lower leg: No edema.    Skin:     General: Skin is warm and dry.      Capillary Refill: Capillary refill takes 2 to 3 seconds.   Neurological:      General: No focal deficit present.      Comments: Sedated on propofol, following commands off sedation   Psychiatric:      Comments: Unable to speak to patient       Vents:  Vent Mode: A/C (02/13/23 0815)  Ventilator Initiated: Yes (02/12/23 1450)  Set Rate: 24 BPM (02/13/23 0815)  Vt Set: 350 mL (02/13/23 0815)  PEEP/CPAP: 5 cmH20 (02/13/23 0815)  Oxygen Concentration (%): 30 (02/13/23 1100)  Peak Airway Pressure: 19 cmH20 (02/13/23 0815)  Plateau Pressure: 0 cmH20 (02/13/23 0815)  Total Ve: 8.18 L/m (02/13/23 0815)  Negative Inspiratory Force (cm H2O): 0 (02/13/23 0815)  F/VT Ratio<105 (RSBI): (!) 74.53 (02/13/23 0815)    Lines/Drains/Airways       Drain  Duration                  NG/OG Tube 02/12/23 [place] sump 18 Fr. Center mouth 1 day         Urethral Catheter 02/12/23 1400 Temperature probe;Straight-tip 16 Fr. 1 day              Airway  Duration                  Airway - Non-Surgical 02/12/23 Endotracheal Tube 1 day              Peripheral Intravenous Line  Duration                  Peripheral IV - Single Lumen 02/12/23 1330 20 G Left;Posterior Wrist 1 day         Peripheral IV - Single Lumen 02/12/23 1531 22 G Anterior;Left;Proximal Forearm <1 day         Peripheral IV - Single Lumen 02/12/23 1532 18 G Right <1 day         Peripheral IV - Single Lumen 02/12/23 1533 22 G Posterior;Right Forearm <1 day                    Significant Labs:    CBC/Anemia Profile:  Recent Labs   Lab 02/12/23  1414 02/12/23  1726 02/13/23  0336   WBC 23.42*  --  13.24*   HGB 15.0  --  13.8*   HCT 45.3 42 38.7*     --  186   MCV 92  --  90   RDW 15.4*  --  15.4*        Chemistries:  Recent Labs   Lab 02/12/23  1414 02/13/23  0337    139   K 3.6 3.6    105   CO2 20* 22*   BUN 33* 26*   CREATININE 2.3* 1.6*   CALCIUM 9.6 8.7   ALBUMIN 4.3 3.4*   PROT 8.5* 7.7   BILITOT 1.1* 0.9   ALKPHOS 83 59    ALT 23 19   AST 35 33   MG 2.2 1.6   PHOS  --  3.5       ABGs:   Recent Labs   Lab 02/12/23  1726   PH 7.297*   PCO2 53.3*   HCO3 26.0   POCSATURATED 94*   BE 0       Significant Imaging:   CXR: I have reviewed all pertinent results/findings within the past 24 hours and my personal findings are:  large cardiac silhouette noted, sternotomy wires, maybe some RML airspace disease concerning for aspiration     Assessment/Plan:     * Hypertensive emergency  Patient off Cardene drip with PRN pushes when needed.  - PRN IV antihypertensives until he passes a swallow study    Elevated troponin  Patient with unknown history and identifying information. He has had a CABG in the past with an enlarged cardiac silhouette concerning for a least CAD and likely HFrEF. Troponins increased from .05 to 0.7 with initial EKG showing t-wave inversions in inferior and lateral leads (unclear if these are new or old changes). Will repeat EKG with this increase in troponin as well as repeat troponin to monitor progression. Cocaine positive on admission.  - repeat Troponin  - repeat EKG  - follow up TTE  - consider cardiology consult based on these results    Polysubstance abuse  + THC and Cocaine    Acute renal failure  Unknown baseline. Improved from 2.3 to 1.6 this morning with resuscitation.   - daily BMP    Acute hypercapnic respiratory failure  Patient with Hypercapnic Respiratory failure which is Acute.  he is not on home oxygen. Supplemental oxygen was provided and noted- Vent Mode: A/C  Oxygen Concentration (%):  [] 30  Resp Rate Total:  [22 br/min-34 br/min] 22 br/min  Vt Set:  [300 mL-350 mL] 350 mL  PEEP/CPAP:  [5 cmH20] 5 cmH20  Mean Airway Pressure:  [9.6 hyN28-89 cmH20] 10 cmH20.   Signs/symptoms of respiratory failure include- respiratory distress. Contributing diagnoses includes - Drug overdose Labs and images were reviewed. Patient Has recent ABG, which has been reviewed. Will treat underlying causes and adjust  management of respiratory failure as follows    Patient with some hypercapnic respiratory failure, seizure like activity, and inability to protect his airway. UDS positive for THC and cocaine with pinpoint pupils that did not respond to Narcan. This morning, the patient passed his SBT trial after sedation was held and he was extubated successfully to BiPAP. Will continue on BiPAP for a few hours. May need more BiPAP at night if he remains intermittently somnolent.  - extubated 2/13 AM to BiPAP  - continue BiPAP nightly if needed  - will place on NC if needed during the day        Thank you for your consult.     Justin Ellerman, MD  Pulmonology  [facility] - Intensive Care ([place])

## 2023-02-13 NOTE — HPI
"Per admitting physician:  "Unknown male was found down in the street.  It was reported that a bystander said his name was "Alejandro" but that person is no longer available to be interviewed.  The fire department reported pinpoint pupils with agonal respiration and he was given Narcan without improvement in his mentation.  On arrival to the hospital, he was noted to be drooling and contracted, and he started having seizure-like activity witnessed by staff.  Physical exam by the ER physician document eye deviation to the left and downward.  He was unable to protect his airway and was intubated.  Noted to be hypothermic with a temperature of 91.6° F, heart rate 131, duration 34, blood pressure 242/144.  Treatment initiated with Cardene drip for blood pressure control and patient was given empiric cefepime and vancomycin after obtaining cultures.  Workup in the ER, remarkable for head CT showed no acute abnormalities but showed "Foci of low attenuation involving the bilateral corona radiata and basal ganglia are suspected to reflect that of remote infarct." U tox remarkable for cocaine and marijuana, BUN and creatinine of 33/2.3, , , troponin 0.049, lactate 5.1 procalcitonin 1.09, serum alcohol negative, WBC 23.42, UA overall not remarkable, chest x-ray without definitive infiltrate, ABG pH 7.184 pCO2 71 PO2 330 bicarb 29."    Patient was intubated and sedated upon arrival to the ED so all information was gathered from previous recorded notes and the ICU staff as well as the primary physician.   "

## 2023-02-13 NOTE — SUBJECTIVE & OBJECTIVE
Interval History:  No acute events, warmed, staff report patient with spontaneous movement in all extremities overnight.  Cardene drip stopped this morning, and propofol sedation decreased.  Patient able to respond to verbal stimulus, maintain gaze and squeeze hand when asked.  SBT trial in progress.  On minimal vent settings.    Review of Systems   Unable to perform ROS: Intubated   Objective:     Vital Signs (Most Recent):  Temp: 99.3 °F (37.4 °C) (02/13/23 0415)  Pulse: 77 (02/13/23 0415)  Resp: (!) 24 (02/13/23 0415)  BP: (!) 170/92 (02/13/23 0345)  SpO2: 97 % (02/13/23 0415)   Vital Signs (24h Range):  Temp:  [91.6 °F (33.1 °C)-100.6 °F (38.1 °C)] 99.3 °F (37.4 °C)  Pulse:  [] 77  Resp:  [8-36] 24  SpO2:  [96 %-100 %] 97 %  BP: (110-287)/() 170/92     Weight: 71.4 kg (157 lb 6.5 oz)  There is no height or weight on file to calculate BMI.    Intake/Output Summary (Last 24 hours) at 2/13/2023 0729  Last data filed at 2/13/2023 0605  Gross per 24 hour   Intake 5731.62 ml   Output 3675 ml   Net 2056.62 ml      Physical Exam  Vitals and nursing note reviewed.   Constitutional:       Comments: Disheveled, thin, likely sedated   HENT:      Head: Normocephalic.      Comments: ET tube in place     Nose: Nose normal.   Eyes:      Conjunctiva/sclera: Conjunctivae normal.   Cardiovascular:      Rate and Rhythm: Normal rate and regular rhythm.      Comments: Healed sternotomy scar with noted erythema mid chest  Pulmonary:      Comments: Coarse ventilated breath sounds, no wheezing  Abdominal:      General: There is no distension.      Palpations: Abdomen is soft.      Tenderness: There is no abdominal tenderness. There is no guarding or rebound.      Comments: Hypoactive bowel sounds   Musculoskeletal:      Right lower leg: No edema.      Left lower leg: No edema.   Skin:     General: Skin is dry.      Capillary Refill: Capillary refill takes 2 to 3 seconds.   Neurological:      Comments: Response to verbal and  tactile stimulus.  Turns to voice and can track, follows simple commands (squeeze hands when asked), spontaneous moving all extremities   Psychiatric:      Comments: Sedated on vent       Significant Labs: All pertinent labs within the past 24 hours have been reviewed.    Significant Imaging: I have reviewed all pertinent imaging results/findings within the past 24 hours.

## 2023-02-13 NOTE — SUBJECTIVE & OBJECTIVE
No past medical history on file.    No past surgical history on file.    Review of patient's allergies indicates:  Not on File    Family History    None       Tobacco Use    Smoking status: Not on file    Smokeless tobacco: Not on file   Substance and Sexual Activity    Alcohol use: Not on file    Drug use: Not on file    Sexual activity: Not on file         Review of Systems   Unable to perform ROS: Intubated   Objective:     Vital Signs (Most Recent):  Temp: 99 °F (37.2 °C) (02/13/23 1430)  Pulse: 102 (02/13/23 1430)  Resp: 20 (02/13/23 1430)  BP: (!) 208/106 (02/13/23 1430)  SpO2: 100 % (02/13/23 1430)   Vital Signs (24h Range):  Temp:  [96.4 °F (35.8 °C)-100.6 °F (38.1 °C)] 99 °F (37.2 °C)  Pulse:  [] 102  Resp:  [8-27] 20  SpO2:  [96 %-100 %] 100 %  BP: (110-220)/() 208/106     Weight: 71.2 kg (156 lb 15.5 oz)  Body mass index is 22.52 kg/m².      Intake/Output Summary (Last 24 hours) at 2/13/2023 1434  Last data filed at 2/13/2023 0605  Gross per 24 hour   Intake 5731.62 ml   Output 3675 ml   Net 2056.62 ml       Physical Exam  Vitals and nursing note reviewed.   Constitutional:       Appearance: He is normal weight. He is ill-appearing.      Comments: Following verbal commands off sedation   HENT:      Head: Normocephalic.      Mouth/Throat:      Mouth: Mucous membranes are moist.      Pharynx: Oropharynx is clear.   Eyes:      General: No scleral icterus.     Conjunctiva/sclera: Conjunctivae normal.   Cardiovascular:      Rate and Rhythm: Regular rhythm. Tachycardia present.      Heart sounds: Murmur heard.      Comments: Large sternotomy scar noted  Pulmonary:      Effort: Pulmonary effort is normal. No respiratory distress.      Breath sounds: No wheezing, rhonchi or rales.   Abdominal:      Palpations: Abdomen is soft.   Musculoskeletal:      Right lower leg: No edema.      Left lower leg: No edema.   Skin:     General: Skin is warm and dry.      Capillary Refill: Capillary refill takes 2 to 3  seconds.   Neurological:      General: No focal deficit present.      Comments: Sedated on propofol, following commands off sedation   Psychiatric:      Comments: Unable to speak to patient       Vents:  Vent Mode: A/C (02/13/23 0815)  Ventilator Initiated: Yes (02/12/23 1450)  Set Rate: 24 BPM (02/13/23 0815)  Vt Set: 350 mL (02/13/23 0815)  PEEP/CPAP: 5 cmH20 (02/13/23 0815)  Oxygen Concentration (%): 30 (02/13/23 1100)  Peak Airway Pressure: 19 cmH20 (02/13/23 0815)  Plateau Pressure: 0 cmH20 (02/13/23 0815)  Total Ve: 8.18 L/m (02/13/23 0815)  Negative Inspiratory Force (cm H2O): 0 (02/13/23 0815)  F/VT Ratio<105 (RSBI): (!) 74.53 (02/13/23 0815)    Lines/Drains/Airways       Drain  Duration                  NG/OG Tube 02/12/23 [place] sump 18 Fr. Center mouth 1 day         Urethral Catheter 02/12/23 1400 Temperature probe;Straight-tip 16 Fr. 1 day              Airway  Duration                  Airway - Non-Surgical 02/12/23 Endotracheal Tube 1 day              Peripheral Intravenous Line  Duration                  Peripheral IV - Single Lumen 02/12/23 1330 20 G Left;Posterior Wrist 1 day         Peripheral IV - Single Lumen 02/12/23 1531 22 G Anterior;Left;Proximal Forearm <1 day         Peripheral IV - Single Lumen 02/12/23 1532 18 G Right <1 day         Peripheral IV - Single Lumen 02/12/23 1533 22 G Posterior;Right Forearm <1 day                    Significant Labs:    CBC/Anemia Profile:  Recent Labs   Lab 02/12/23  1414 02/12/23  1726 02/13/23  0336   WBC 23.42*  --  13.24*   HGB 15.0  --  13.8*   HCT 45.3 42 38.7*     --  186   MCV 92  --  90   RDW 15.4*  --  15.4*        Chemistries:  Recent Labs   Lab 02/12/23  1414 02/13/23  0337    139   K 3.6 3.6    105   CO2 20* 22*   BUN 33* 26*   CREATININE 2.3* 1.6*   CALCIUM 9.6 8.7   ALBUMIN 4.3 3.4*   PROT 8.5* 7.7   BILITOT 1.1* 0.9   ALKPHOS 83 59   ALT 23 19   AST 35 33   MG 2.2 1.6   PHOS  --  3.5       ABGs:   Recent Labs   Lab  02/12/23  1726   PH 7.297*   PCO2 53.3*   HCO3 26.0   POCSATURATED 94*   BE 0       Significant Imaging:   CXR: I have reviewed all pertinent results/findings within the past 24 hours and my personal findings are:  large cardiac silhouette noted, sternotomy wires, maybe some RML airspace disease concerning for aspiration

## 2023-02-13 NOTE — CARE UPDATE
Extubated to BIPAP and doing well. Name is Alejandro but no last name yet. Encephalopathy likely due to HTN emergency associated with polysubstance abuse (cocaine, MJ, and unknown substance) responsible for AMS. Will d/c Teleneurology consult and EEG. Appreciate Pulm/Critical care input.    ANDREA Rosenberg MD

## 2023-02-13 NOTE — PLAN OF CARE
Recommendations  1) Advance diet as medically able to clear liquid diet - add Boost Breeze TID while on CLD    2) If unable to advance diet, initiate EN: via NG/OG Impact Peptide 1.5 @ 10 mL adv by 10 mL q4-6h until goal rate of 45 mL/hr is met,   Hold for residuals > 400 mL.  mL H20 q4h, additional fluids to be determined by MD   Provides 1620 kcal, 102 g P, 151 g C, 832 mL total H20    3) RD to follow up  Goals:   Patient to have a means to nutrition by RD follow up  Monitored labs to trend toward target ranges    Nutrition Goal Status: new  Communication of RD Recs:  (POC)

## 2023-02-14 LAB
ALBUMIN SERPL BCP-MCNC: 3.3 G/DL (ref 3.5–5.2)
ALBUMIN SERPL BCP-MCNC: 3.3 G/DL (ref 3.5–5.2)
ALBUMIN SERPL BCP-MCNC: 3.4 G/DL (ref 3.5–5.2)
ALBUMIN SERPL BCP-MCNC: 4.3 G/DL (ref 3.5–5.2)
ALP SERPL-CCNC: 59 U/L (ref 55–135)
ALP SERPL-CCNC: 67 U/L (ref 55–135)
ALP SERPL-CCNC: 70 U/L (ref 55–135)
ALP SERPL-CCNC: 83 U/L (ref 55–135)
ALT SERPL W/O P-5'-P-CCNC: 19 U/L (ref 10–44)
ALT SERPL W/O P-5'-P-CCNC: 19 U/L (ref 10–44)
ALT SERPL W/O P-5'-P-CCNC: 22 U/L (ref 10–44)
ALT SERPL W/O P-5'-P-CCNC: 23 U/L (ref 10–44)
ANION GAP SERPL CALC-SCNC: 10 MMOL/L (ref 8–16)
ANION GAP SERPL CALC-SCNC: 12 MMOL/L (ref 8–16)
ANION GAP SERPL CALC-SCNC: 12 MMOL/L (ref 8–16)
ANION GAP SERPL CALC-SCNC: 18 MMOL/L (ref 8–16)
AST SERPL-CCNC: 30 U/L (ref 10–40)
AST SERPL-CCNC: 33 U/L (ref 10–40)
AST SERPL-CCNC: 35 U/L (ref 10–40)
AST SERPL-CCNC: 46 U/L (ref 10–40)
BASOPHILS # BLD AUTO: 0.04 K/UL (ref 0–0.2)
BASOPHILS NFR BLD: 0.3 % (ref 0–1.9)
BILIRUB SERPL-MCNC: 0.7 MG/DL (ref 0.1–1)
BILIRUB SERPL-MCNC: 0.8 MG/DL (ref 0.1–1)
BILIRUB SERPL-MCNC: 0.9 MG/DL (ref 0.1–1)
BILIRUB SERPL-MCNC: 1.1 MG/DL (ref 0.1–1)
BUN SERPL-MCNC: 21 MG/DL (ref 8–23)
BUN SERPL-MCNC: 22 MG/DL (ref 8–23)
BUN SERPL-MCNC: 26 MG/DL (ref 8–23)
BUN SERPL-MCNC: 33 MG/DL (ref 8–23)
CALCIUM SERPL-MCNC: 8.7 MG/DL (ref 8.7–10.5)
CALCIUM SERPL-MCNC: 9.4 MG/DL (ref 8.7–10.5)
CALCIUM SERPL-MCNC: 9.5 MG/DL (ref 8.7–10.5)
CALCIUM SERPL-MCNC: 9.6 MG/DL (ref 8.7–10.5)
CHLORIDE SERPL-SCNC: 104 MMOL/L (ref 95–110)
CHLORIDE SERPL-SCNC: 105 MMOL/L (ref 95–110)
CHLORIDE SERPL-SCNC: 108 MMOL/L (ref 95–110)
CHLORIDE SERPL-SCNC: 108 MMOL/L (ref 95–110)
CO2 SERPL-SCNC: 20 MMOL/L (ref 23–29)
CO2 SERPL-SCNC: 22 MMOL/L (ref 23–29)
CO2 SERPL-SCNC: 22 MMOL/L (ref 23–29)
CO2 SERPL-SCNC: 24 MMOL/L (ref 23–29)
CREAT SERPL-MCNC: 1.3 MG/DL (ref 0.5–1.4)
CREAT SERPL-MCNC: 1.3 MG/DL (ref 0.5–1.4)
CREAT SERPL-MCNC: 1.6 MG/DL (ref 0.5–1.4)
CREAT SERPL-MCNC: 2.3 MG/DL (ref 0.5–1.4)
DIFFERENTIAL METHOD: ABNORMAL
EOSINOPHIL # BLD AUTO: 0 K/UL (ref 0–0.5)
EOSINOPHIL NFR BLD: 0.1 % (ref 0–8)
ERYTHROCYTE [DISTWIDTH] IN BLOOD BY AUTOMATED COUNT: 16.6 % (ref 11.5–14.5)
EST. GFR  (NO RACE VARIABLE): 30 ML/MIN/1.73 M^2
EST. GFR  (NO RACE VARIABLE): 46 ML/MIN/1.73 M^2
EST. GFR  (NO RACE VARIABLE): 59 ML/MIN/1.73 M^2
EST. GFR  (NO RACE VARIABLE): 59 ML/MIN/1.73 M^2
GLUCOSE SERPL-MCNC: 107 MG/DL (ref 70–110)
GLUCOSE SERPL-MCNC: 110 MG/DL (ref 70–110)
GLUCOSE SERPL-MCNC: 232 MG/DL (ref 70–110)
GLUCOSE SERPL-MCNC: 83 MG/DL (ref 70–110)
HCT VFR BLD AUTO: 44.7 % (ref 40–54)
HGB BLD-MCNC: 15.2 G/DL (ref 14–18)
IMM GRANULOCYTES # BLD AUTO: 0.04 K/UL (ref 0–0.04)
IMM GRANULOCYTES NFR BLD AUTO: 0.3 % (ref 0–0.5)
LYMPHOCYTES # BLD AUTO: 0.7 K/UL (ref 1–4.8)
LYMPHOCYTES NFR BLD: 5.8 % (ref 18–48)
MAGNESIUM SERPL-MCNC: 1.7 MG/DL (ref 1.6–2.6)
MAGNESIUM SERPL-MCNC: 2.4 MG/DL (ref 1.6–2.6)
MCH RBC QN AUTO: 30.1 PG (ref 27–31)
MCHC RBC AUTO-ENTMCNC: 34 G/DL (ref 32–36)
MCV RBC AUTO: 89 FL (ref 82–98)
MONOCYTES # BLD AUTO: 0.6 K/UL (ref 0.3–1)
MONOCYTES NFR BLD: 4.9 % (ref 4–15)
NEUTROPHILS # BLD AUTO: 10.4 K/UL (ref 1.8–7.7)
NEUTROPHILS NFR BLD: 88.6 % (ref 38–73)
NRBC BLD-RTO: 0 /100 WBC
PHOSPHATE SERPL-MCNC: 2.4 MG/DL (ref 2.7–4.5)
PHOSPHATE SERPL-MCNC: 4 MG/DL (ref 2.7–4.5)
PLATELET # BLD AUTO: 191 K/UL (ref 150–450)
PMV BLD AUTO: 11.7 FL (ref 9.2–12.9)
POTASSIUM SERPL-SCNC: 2.8 MMOL/L (ref 3.5–5.1)
POTASSIUM SERPL-SCNC: 3.6 MMOL/L (ref 3.5–5.1)
POTASSIUM SERPL-SCNC: 3.6 MMOL/L (ref 3.5–5.1)
POTASSIUM SERPL-SCNC: 5.5 MMOL/L (ref 3.5–5.1)
PROT SERPL-MCNC: 7.3 G/DL (ref 6–8.4)
PROT SERPL-MCNC: 7.7 G/DL (ref 6–8.4)
PROT SERPL-MCNC: 8.4 G/DL (ref 6–8.4)
PROT SERPL-MCNC: 8.5 G/DL (ref 6–8.4)
RBC # BLD AUTO: 5.05 M/UL (ref 4.6–6.2)
SODIUM SERPL-SCNC: 139 MMOL/L (ref 136–145)
SODIUM SERPL-SCNC: 140 MMOL/L (ref 136–145)
SODIUM SERPL-SCNC: 142 MMOL/L (ref 136–145)
SODIUM SERPL-SCNC: 144 MMOL/L (ref 136–145)
WBC # BLD AUTO: 11.71 K/UL (ref 3.9–12.7)

## 2023-02-14 PROCEDURE — 94660 CPAP INITIATION&MGMT: CPT

## 2023-02-14 PROCEDURE — 99233 PR SUBSEQUENT HOSPITAL CARE,LEVL III: ICD-10-PCS | Mod: ,,, | Performed by: INTERNAL MEDICINE

## 2023-02-14 PROCEDURE — 11000001 HC ACUTE MED/SURG PRIVATE ROOM

## 2023-02-14 PROCEDURE — C9113 INJ PANTOPRAZOLE SODIUM, VIA: HCPCS | Performed by: HOSPITALIST

## 2023-02-14 PROCEDURE — 94761 N-INVAS EAR/PLS OXIMETRY MLT: CPT

## 2023-02-14 PROCEDURE — 97163 PT EVAL HIGH COMPLEX 45 MIN: CPT

## 2023-02-14 PROCEDURE — 97165 OT EVAL LOW COMPLEX 30 MIN: CPT

## 2023-02-14 PROCEDURE — 25000003 PHARM REV CODE 250: Performed by: HOSPITALIST

## 2023-02-14 PROCEDURE — 99900035 HC TECH TIME PER 15 MIN (STAT)

## 2023-02-14 PROCEDURE — 99233 PR SUBSEQUENT HOSPITAL CARE,LEVL III: ICD-10-PCS | Mod: GC,,, | Performed by: INTERNAL MEDICINE

## 2023-02-14 PROCEDURE — 25000003 PHARM REV CODE 250: Performed by: INTERNAL MEDICINE

## 2023-02-14 PROCEDURE — 99233 SBSQ HOSP IP/OBS HIGH 50: CPT | Mod: ,,, | Performed by: INTERNAL MEDICINE

## 2023-02-14 PROCEDURE — 97535 SELF CARE MNGMENT TRAINING: CPT

## 2023-02-14 PROCEDURE — 84100 ASSAY OF PHOSPHORUS: CPT | Performed by: NURSE PRACTITIONER

## 2023-02-14 PROCEDURE — 97530 THERAPEUTIC ACTIVITIES: CPT

## 2023-02-14 PROCEDURE — 99233 SBSQ HOSP IP/OBS HIGH 50: CPT | Mod: GC,,, | Performed by: INTERNAL MEDICINE

## 2023-02-14 PROCEDURE — 83735 ASSAY OF MAGNESIUM: CPT | Performed by: NURSE PRACTITIONER

## 2023-02-14 PROCEDURE — 25000003 PHARM REV CODE 250: Performed by: NURSE PRACTITIONER

## 2023-02-14 PROCEDURE — 85025 COMPLETE CBC W/AUTO DIFF WBC: CPT | Performed by: HOSPITALIST

## 2023-02-14 PROCEDURE — 63600175 PHARM REV CODE 636 W HCPCS: Performed by: NURSE PRACTITIONER

## 2023-02-14 PROCEDURE — 84100 ASSAY OF PHOSPHORUS: CPT | Mod: 91 | Performed by: INTERNAL MEDICINE

## 2023-02-14 PROCEDURE — 80053 COMPREHEN METABOLIC PANEL: CPT | Performed by: NURSE PRACTITIONER

## 2023-02-14 PROCEDURE — 63600175 PHARM REV CODE 636 W HCPCS: Mod: TB,JG | Performed by: HOSPITALIST

## 2023-02-14 PROCEDURE — 83735 ASSAY OF MAGNESIUM: CPT | Mod: 91 | Performed by: INTERNAL MEDICINE

## 2023-02-14 PROCEDURE — 80053 COMPREHEN METABOLIC PANEL: CPT | Mod: 91 | Performed by: INTERNAL MEDICINE

## 2023-02-14 RX ORDER — NIFEDIPINE 30 MG/1
60 TABLET, EXTENDED RELEASE ORAL 2 TIMES DAILY
Status: DISCONTINUED | OUTPATIENT
Start: 2023-02-14 | End: 2023-02-17 | Stop reason: HOSPADM

## 2023-02-14 RX ORDER — NIFEDIPINE 30 MG/1
60 TABLET, EXTENDED RELEASE ORAL DAILY
Status: DISCONTINUED | OUTPATIENT
Start: 2023-02-14 | End: 2023-02-14

## 2023-02-14 RX ORDER — ONDANSETRON 4 MG/1
4 TABLET, ORALLY DISINTEGRATING ORAL EVERY 6 HOURS PRN
Status: DISCONTINUED | OUTPATIENT
Start: 2023-02-14 | End: 2023-02-17 | Stop reason: HOSPADM

## 2023-02-14 RX ORDER — NICARDIPINE HYDROCHLORIDE 0.2 MG/ML
0-15 INJECTION INTRAVENOUS CONTINUOUS
Status: DISCONTINUED | OUTPATIENT
Start: 2023-02-14 | End: 2023-02-14

## 2023-02-14 RX ORDER — MAGNESIUM SULFATE HEPTAHYDRATE 40 MG/ML
2 INJECTION, SOLUTION INTRAVENOUS ONCE
Status: COMPLETED | OUTPATIENT
Start: 2023-02-14 | End: 2023-02-14

## 2023-02-14 RX ORDER — ACETAMINOPHEN 325 MG/1
650 TABLET ORAL EVERY 6 HOURS PRN
Status: DISCONTINUED | OUTPATIENT
Start: 2023-02-14 | End: 2023-02-17 | Stop reason: HOSPADM

## 2023-02-14 RX ORDER — ONDANSETRON 2 MG/ML
4 INJECTION INTRAMUSCULAR; INTRAVENOUS EVERY 6 HOURS PRN
Status: DISCONTINUED | OUTPATIENT
Start: 2023-02-14 | End: 2023-02-17 | Stop reason: HOSPADM

## 2023-02-14 RX ADMIN — CEFEPIME HYDROCHLORIDE 2 G: 2 INJECTION, SOLUTION INTRAVENOUS at 05:02

## 2023-02-14 RX ADMIN — MAGNESIUM SULFATE HEPTAHYDRATE 2 G: 40 INJECTION, SOLUTION INTRAVENOUS at 01:02

## 2023-02-14 RX ADMIN — HEPARIN SODIUM 5000 UNITS: 5000 INJECTION INTRAVENOUS; SUBCUTANEOUS at 09:02

## 2023-02-14 RX ADMIN — CHLORHEXIDINE GLUCONATE 0.12% ORAL RINSE 15 ML: 1.2 LIQUID ORAL at 09:02

## 2023-02-14 RX ADMIN — HEPARIN SODIUM 5000 UNITS: 5000 INJECTION INTRAVENOUS; SUBCUTANEOUS at 05:02

## 2023-02-14 RX ADMIN — PANTOPRAZOLE SODIUM 40 MG: 40 INJECTION, POWDER, FOR SOLUTION INTRAVENOUS at 08:02

## 2023-02-14 RX ADMIN — HEPARIN SODIUM 5000 UNITS: 5000 INJECTION INTRAVENOUS; SUBCUTANEOUS at 03:02

## 2023-02-14 RX ADMIN — HYDRALAZINE HYDROCHLORIDE 20 MG: 20 INJECTION INTRAMUSCULAR; INTRAVENOUS at 04:02

## 2023-02-14 RX ADMIN — CHLORHEXIDINE GLUCONATE 0.12% ORAL RINSE 15 ML: 1.2 LIQUID ORAL at 08:02

## 2023-02-14 RX ADMIN — POTASSIUM PHOSPHATE, MONOBASIC AND POTASSIUM PHOSPHATE, DIBASIC 30 MMOL: 224; 236 INJECTION, SOLUTION, CONCENTRATE INTRAVENOUS at 01:02

## 2023-02-14 RX ADMIN — MUPIROCIN: 20 OINTMENT TOPICAL at 09:02

## 2023-02-14 RX ADMIN — ACETAMINOPHEN 650 MG: 325 TABLET, FILM COATED ORAL at 03:02

## 2023-02-14 RX ADMIN — NIFEDIPINE 60 MG: 30 TABLET, FILM COATED, EXTENDED RELEASE ORAL at 09:02

## 2023-02-14 RX ADMIN — FAMOTIDINE 20 MG: 10 INJECTION, SOLUTION INTRAVENOUS at 09:02

## 2023-02-14 RX ADMIN — MUPIROCIN: 20 OINTMENT TOPICAL at 08:02

## 2023-02-14 NOTE — PROGRESS NOTES
"[facility] Intensive Care [facility] Medicine  Progress Note    Patient Name: Alejandro Farrar  MRN: [id number]  Patient Class: IP- Inpatient   Admission Date: 2/12/2023  Length of Stay: 2 days  Attending Physician: NEL Sinha MD  Primary Care Provider: Primary Doctor No        Subjective:     Principal Problem:Hypertensive emergency        HPI:  Unknown male was found down in the street.  It was reported that a bystander said his name was "Alejandro" but that person is no longer available to be interviewed.  The fire department reported pinpoint pupils with agonal respiration and he was given Narcan without improvement in his mentation.  On arrival to the hospital, he was noted to be drooling and contracted, and he started having seizure-like activity witnessed by staff.  Physical exam by the ER physician document eye deviation to the left and downward.  He was unable to protect his airway and was intubated.  Noted to be hypothermic with a temperature of 91.6° F, heart rate 131, duration 34, blood pressure 242/144.  Treatment initiated with Cardene drip for blood pressure control and patient was given empiric cefepime and vancomycin after obtaining cultures.  Workup in the ER, remarkable for head CT showed no acute abnormalities but showed "Foci of low attenuation involving the bilateral corona radiata and basal ganglia are suspected to reflect that of remote infarct." U tox remarkable for cocaine and marijuana, BUN and creatinine of 33/2.3, , , troponin 0.049, lactate 5.1 procalcitonin 1.09, serum alcohol negative, WBC 23.42, UA overall not remarkable, chest x-ray without definitive infiltrate, ABG pH 7.184 pCO2 71 PO2 330 bicarb 29.        Overview/Hospital Course:  Unknown male admitted with hypertensive emergency with encephalopathy, acute respiratory failure and acute renal failure with unknown baseline.  Intubated for airway protection, U tox positive for cocaine and marijuana, initial head " CT with no acute finding but possible remote infarct and microvascular ischemic changes.  Initial concern for seizure-like activity reported in the ER and he was noted to be hypothermic.  Empirically started on cefepime and vancomycin, Cardene drip pressure control.      Interval History: No acute events overnight. Weaned from nicardipine gtt. States his name is Alejandro Farrar.    Review of Systems   Constitutional:  Negative for chills and fever.   Respiratory:  Negative for cough and shortness of breath.    Cardiovascular:  Negative for chest pain and palpitations.   Gastrointestinal:  Negative for abdominal pain and nausea.   Objective:     Vital Signs (Most Recent):  Temp: 97.2 °F (36.2 °C) (02/14/23 0800)  Pulse: 106 (02/14/23 0800)  Resp: (!) 28 (02/14/23 0800)  BP: (!) 178/86 (02/14/23 0800)  SpO2: 97 % (02/14/23 0800)   Vital Signs (24h Range):  Temp:  [97.2 °F (36.2 °C)-99.7 °F (37.6 °C)] 97.2 °F (36.2 °C)  Pulse:  [] 106  Resp:  [16-40] 28  SpO2:  [96 %-100 %] 97 %  BP: (130-220)/() 178/86     Weight: 71.2 kg (156 lb 15.5 oz)  Body mass index is 22.52 kg/m².    Intake/Output Summary (Last 24 hours) at 2/14/2023 0817  Last data filed at 2/14/2023 0529  Gross per 24 hour   Intake 900.02 ml   Output 1810 ml   Net -909.98 ml      Physical Exam  Vitals and nursing note reviewed.   Constitutional:       General: He is not in acute distress.     Appearance: He is well-developed.   HENT:      Head: Normocephalic and atraumatic.   Eyes:      General:         Right eye: No discharge.         Left eye: No discharge.      Conjunctiva/sclera: Conjunctivae normal.   Cardiovascular:      Rate and Rhythm: Normal rate.      Pulses: Normal pulses.      Heart sounds: Murmur heard.   Pulmonary:      Effort: Pulmonary effort is normal. No respiratory distress.   Abdominal:      Palpations: Abdomen is soft.      Tenderness: There is no abdominal tenderness.   Musculoskeletal:         General: Normal range of motion.  "     Right lower leg: No edema.      Left lower leg: No edema.      Comments: LUE edema   Skin:     General: Skin is warm and dry.   Neurological:      Mental Status: He is alert.      Comments: Oriented x ?1 (states name; "nataliia" for location, "1977")     Significant Labs:   CBC:  Recent Labs   Lab 02/12/23  1414 02/12/23  1726 02/13/23  0336   WBC 23.42*  --  13.24*   HGB 15.0  --  13.8*   HCT 45.3 42 38.7*     --  186   GRAN 79.0*  Test Not Performed  --  84.9*  11.3*   LYMPH 8.0*  Test Not Performed  --  6.4*  0.9*   MONO 9.0  Test Not Performed  --  7.6  1.0   EOS Test Not Performed  --  0.0   BASO Test Not Performed  --  0.05   CMP:  Recent Labs   Lab 02/12/23  1414 02/13/23  0337 02/14/23  0011    139 144   K 3.6 3.6 2.8*    105 108   CO2 20* 22* 24   BUN 33* 26* 21*   CREATININE 2.3* 1.6* 1.3   * 83 107   CALCIUM 9.6 8.7 9.4   MG 2.2 1.6 1.7   PHOS  --  3.5 2.4*   ALKPHOS 83 59 67   AST 35 33 30   ALT 23 19 19   BILITOT 1.1* 0.9 0.8   PROT 8.5* 7.7 7.3   ALBUMIN 4.3 3.4* 3.3*   ANIONGAP 18* 12 12      Significant Imaging:   No new imaging this morning.      Assessment/Plan:      * Hypertensive emergency  - Hypertensive emergency with end-organ damage with systolic pressures to the 240s.  - Encephalopathy likely multifactorial with potential stroke, seizure, hypertension, sepsis, and toxins all potential contributors.  - CT Head without acute infarct; UTox positive for cocaine, marijuana.  - Hypothermic in addition initially though was found down outdoors.  - Significant continued improvement in mentation. Able to state name - will discuss with CM to assist with locating family.   - Continue empiric cefepime, vancomycin for time being.  - Weaning from nicardipine gtt. Nursing swallow screen; if passes will start diet, nifedipine 60mg PO daily. Titrate to effect.  - Check U/S LUE with significant unilateral swelling.  - Appreciate pulm/crit assistance.    Elevated troponin  - " As above.    Leukocytosis  - As above.    Hypothermia  - As above.    Metabolic acidosis  - As above.    Polysubstance abuse  - As above.    Acute renal failure  - Unknown baseline; continuing to improve.  - Monitor.    Encephalopathy, metabolic  - As above.    Acute hypercapnic respiratory failure  - As above.    VTE Risk Mitigation (From admission, onward)         Ordered     heparin (porcine) injection 5,000 Units  Every 8 hours         02/12/23 1602     IP VTE HIGH RISK PATIENT  Once         02/12/23 1602     Place sequential compression device  Until discontinued         02/12/23 1602                Discharge Planning   VINCE:      Code Status: Full Code   Is the patient medically ready for discharge?:     Reason for patient still in hospital (select all that apply): Treatment                     D Larry Sinha MD  Department of Hospital Medicine   [facility], [place], operated by [facility] - Intensive Care ([place])

## 2023-02-14 NOTE — SUBJECTIVE & OBJECTIVE
"Interval History: No acute events overnight. Weaned from nicardipine gtt. States his name is Alejandro Farrar.    Review of Systems   Constitutional:  Negative for chills and fever.   Respiratory:  Negative for cough and shortness of breath.    Cardiovascular:  Negative for chest pain and palpitations.   Gastrointestinal:  Negative for abdominal pain and nausea.   Objective:     Vital Signs (Most Recent):  Temp: 97.2 °F (36.2 °C) (02/14/23 0800)  Pulse: 106 (02/14/23 0800)  Resp: (!) 28 (02/14/23 0800)  BP: (!) 178/86 (02/14/23 0800)  SpO2: 97 % (02/14/23 0800)   Vital Signs (24h Range):  Temp:  [97.2 °F (36.2 °C)-99.7 °F (37.6 °C)] 97.2 °F (36.2 °C)  Pulse:  [] 106  Resp:  [16-40] 28  SpO2:  [96 %-100 %] 97 %  BP: (130-220)/() 178/86     Weight: 71.2 kg (156 lb 15.5 oz)  Body mass index is 22.52 kg/m².    Intake/Output Summary (Last 24 hours) at 2/14/2023 0817  Last data filed at 2/14/2023 0529  Gross per 24 hour   Intake 900.02 ml   Output 1810 ml   Net -909.98 ml      Physical Exam  Vitals and nursing note reviewed.   Constitutional:       General: He is not in acute distress.     Appearance: He is well-developed.   HENT:      Head: Normocephalic and atraumatic.   Eyes:      General:         Right eye: No discharge.         Left eye: No discharge.      Conjunctiva/sclera: Conjunctivae normal.   Cardiovascular:      Rate and Rhythm: Normal rate.      Pulses: Normal pulses.      Heart sounds: Murmur heard.   Pulmonary:      Effort: Pulmonary effort is normal. No respiratory distress.   Abdominal:      Palpations: Abdomen is soft.      Tenderness: There is no abdominal tenderness.   Musculoskeletal:         General: Normal range of motion.      Right lower leg: No edema.      Left lower leg: No edema.      Comments: LUE edema   Skin:     General: Skin is warm and dry.   Neurological:      Mental Status: He is alert.      Comments: Oriented x ?1 (states name; "nataliia" for location, "1977")     Significant " Labs:   CBC:  Recent Labs   Lab 02/12/23  1414 02/12/23  1726 02/13/23  0336   WBC 23.42*  --  13.24*   HGB 15.0  --  13.8*   HCT 45.3 42 38.7*     --  186   GRAN 79.0*  Test Not Performed  --  84.9*  11.3*   LYMPH 8.0*  Test Not Performed  --  6.4*  0.9*   MONO 9.0  Test Not Performed  --  7.6  1.0   EOS Test Not Performed  --  0.0   BASO Test Not Performed  --  0.05   CMP:  Recent Labs   Lab 02/12/23  1414 02/13/23  0337 02/14/23  0011    139 144   K 3.6 3.6 2.8*    105 108   CO2 20* 22* 24   BUN 33* 26* 21*   CREATININE 2.3* 1.6* 1.3   * 83 107   CALCIUM 9.6 8.7 9.4   MG 2.2 1.6 1.7   PHOS  --  3.5 2.4*   ALKPHOS 83 59 67   AST 35 33 30   ALT 23 19 19   BILITOT 1.1* 0.9 0.8   PROT 8.5* 7.7 7.3   ALBUMIN 4.3 3.4* 3.3*   ANIONGAP 18* 12 12      Significant Imaging:   No new imaging this morning.

## 2023-02-14 NOTE — PROGRESS NOTES
[facility] Intensive Care [facility]  Pulmonology  Progress Note    Patient Name: Alejandro Farrar  MRN: [id number]  Admission Date: 2/12/2023  Hospital Length of Stay: 2 days  Code Status: Full Code  Attending Provider: NEL Sinha MD  Primary Care Provider: Primary Doctor No   Principal Problem: Hypertensive emergency    Subjective:     Interval History: Mr. Farrar?did well overnight on BiPAP and was transitioned to RA this morning. He still remains confused and intermittently coherent but was able to tell me that he lives with his mother at home. He was unable to tell me the year, month, or place.       Objective:     Vital Signs (Most Recent):  Temp: 97.5 °F (36.4 °C) (02/14/23 0830)  Pulse: 88 (02/14/23 0830)  Resp: (!) 25 (02/14/23 0830)  BP: (!) 154/83 (02/14/23 0830)  SpO2: 98 % (02/14/23 0830)   Vital Signs (24h Range):  Temp:  [97.2 °F (36.2 °C)-99.7 °F (37.6 °C)] 97.5 °F (36.4 °C)  Pulse:  [] 88  Resp:  [16-40] 25  SpO2:  [96 %-100 %] 98 %  BP: (130-220)/() 154/83     Weight: 71.2 kg (156 lb 15.5 oz)  Body mass index is 22.52 kg/m².      Intake/Output Summary (Last 24 hours) at 2/14/2023 1026  Last data filed at 2/14/2023 0705  Gross per 24 hour   Intake 900.02 ml   Output 1840 ml   Net -939.98 ml       Physical Exam  Vitals and nursing note reviewed.   Constitutional:       Appearance: He is normal weight.      Comments: Somnolent but easily aroused   HENT:      Head: Normocephalic.   Cardiovascular:      Rate and Rhythm: Regular rhythm. Tachycardia present.      Heart sounds: Murmur heard.      Comments: Large sternotomy scar noted  Pulmonary:      Effort: Pulmonary effort is normal. No respiratory distress.      Breath sounds: No wheezing, rhonchi or rales.   Musculoskeletal:      Right lower leg: No edema.      Left lower leg: No edema.   Skin:     General: Skin is warm and dry.      Capillary Refill: Capillary refill takes 2 to 3 seconds.   Neurological:      General: No focal deficit  present.      Mental Status: He is disoriented.   Psychiatric:      Comments: Appears calm      Review of Systems    Vents:  Vent Mode: A/C (02/13/23 0815)  Ventilator Initiated: Yes (02/12/23 1450)  Set Rate: 24 BPM (02/13/23 0815)  Vt Set: 350 mL (02/13/23 0815)  PEEP/CPAP: 5 cmH20 (02/13/23 0815)  Oxygen Concentration (%): 30 (02/14/23 0645)  Peak Airway Pressure: 19 cmH20 (02/13/23 0815)  Plateau Pressure: 0 cmH20 (02/13/23 0815)  Total Ve: 8.18 L/m (02/13/23 0815)  Negative Inspiratory Force (cm H2O): 0 (02/13/23 0815)  F/VT Ratio<105 (RSBI): (!) 74.53 (02/13/23 0815)    Lines/Drains/Airways       Drain  Duration                  Urethral Catheter 02/12/23 1400 Temperature probe;Straight-tip 16 Fr. 1 day              Peripheral Intravenous Line  Duration                  Peripheral IV - Single Lumen 02/12/23 1330 20 G Left;Posterior Wrist 1 day         Peripheral IV - Single Lumen 02/12/23 1531 22 G Anterior;Left;Proximal Forearm 1 day         Peripheral IV - Single Lumen 02/12/23 1532 18 G Right 1 day         Peripheral IV - Single Lumen 02/12/23 1533 22 G Posterior;Right Forearm 1 day         External Jugular IV 02/13/23 1900 <1 day                    Significant Labs:    CBC/Anemia Profile:  Recent Labs   Lab 02/12/23  1414 02/12/23  1726 02/13/23  0336 02/14/23  0837   WBC 23.42*  --  13.24* 11.71   HGB 15.0  --  13.8* 15.2   HCT 45.3 42 38.7* 44.7     --  186 191   MCV 92  --  90 89   RDW 15.4*  --  15.4* 16.6*        Chemistries:  Recent Labs   Lab 02/13/23  0337 02/14/23  0011 02/14/23  0912    144 140   K 3.6 2.8* 5.5*    108 108   CO2 22* 24 22*   BUN 26* 21* 22*   CREATININE 1.6* 1.3 1.3   CALCIUM 8.7 9.4 9.5   ALBUMIN 3.4* 3.3* 3.3*   PROT 7.7 7.3 8.4   BILITOT 0.9 0.8 0.7   ALKPHOS 59 67 70   ALT 19 19 22   AST 33 30 46*   MG 1.6 1.7 2.4   PHOS 3.5 2.4* 4.0       All pertinent labs within the past 24 hours have been reviewed.    Significant Imaging:  I have reviewed all pertinent  imaging results/findings within the past 24 hours.    Assessment/Plan:     * Hypertensive emergency  Patient off Cardene drip. Now with PO and SC anti-hypertensive.  - continue oral medications with IV pushes as needed    Polysubstance abuse  + THC and Cocaine    Acute renal failure  Unknown baseline. Improved from 2.3 to 1.3 this morning with resuscitation.   - daily BMP    Acute hypercapnic respiratory failure  Patient with Hypercapnic Respiratory failure which is Acute.  he is not on home oxygen. Supplemental oxygen was provided and noted- Oxygen Concentration (%):  [1-30] 30  Resp Rate Total:  [22 br/min] 22 br/min.   Signs/symptoms of respiratory failure include- respiratory distress. Contributing diagnoses includes - Drug overdose Labs and images were reviewed. Patient Has recent ABG, which has been reviewed. Will treat underlying causes and adjust management of respiratory failure as follows    Patient with some hypercapnic respiratory failure, seizure like activity, and inability to protect his airway. UDS positive for THC and cocaine with pinpoint pupils that did not respond to Narcan. Extubated successfully 2/13. He remains intermittently somnolent but is now on RA and unlikely to need NIV any more.  - NC as needed  - appears to have improving sensorium daily and will no longer need support      Patient with improving mental status and on room air > 24 hours post-extubation. Likely toxidrome from polysubstance abuse. Patient appears stable for transfer out of the ICU if his Bps continue to hold as he is able to take oral medications as well. Critical care team to sign off. Please call back for any further questions or concerns.     Justin Ellerman, MD  Pulmonology  [facility] - Intensive Care ([place])

## 2023-02-14 NOTE — PLAN OF CARE
Problem: Physical Therapy  Goal: Physical Therapy Goal  Description: Goals to be met by: 3/16/2023    Patient will increase functional independence with mobility by performin. Sit<>stand with supervision with rolling walker.  2. Gait x15 ft with contact guard assistance with rolling walker.  3. Able to follow 50% of one step commands.  4. Standing balance x2mins with contact guard assistance.      Outcome: Ongoing, Progressing     Patient evaluated by PT and goals established. Patient lethargic with significant cognitive deficits. He needed verbal and tactile cues with performing different tasks. Patient required mod-max assistance with bed mobility, and minimum assistance with transfers and taking some steps @ EOB.  Recommendation for dc anticipated to SNF; TBD based on progression. PT will continue to follow and progress as tolerated. Please see progress note for detailed plan of care and recommendations.

## 2023-02-14 NOTE — SUBJECTIVE & OBJECTIVE
Interval History: Mr. Waldendid well overnight on BiPAP and was transitioned to RA this morning. He still remains confused and intermittently coherent but was able to tell me that he lives with his mother at home. He was unable to tell me the year, month, or place.       Objective:     Vital Signs (Most Recent):  Temp: 97.5 °F (36.4 °C) (02/14/23 0830)  Pulse: 88 (02/14/23 0830)  Resp: (!) 25 (02/14/23 0830)  BP: (!) 154/83 (02/14/23 0830)  SpO2: 98 % (02/14/23 0830)   Vital Signs (24h Range):  Temp:  [97.2 °F (36.2 °C)-99.7 °F (37.6 °C)] 97.5 °F (36.4 °C)  Pulse:  [] 88  Resp:  [16-40] 25  SpO2:  [96 %-100 %] 98 %  BP: (130-220)/() 154/83     Weight: 71.2 kg (156 lb 15.5 oz)  Body mass index is 22.52 kg/m².      Intake/Output Summary (Last 24 hours) at 2/14/2023 1026  Last data filed at 2/14/2023 0705  Gross per 24 hour   Intake 900.02 ml   Output 1840 ml   Net -939.98 ml       Physical Exam  Vitals and nursing note reviewed.   Constitutional:       Appearance: He is normal weight.      Comments: Somnolent but easily aroused   HENT:      Head: Normocephalic.   Cardiovascular:      Rate and Rhythm: Regular rhythm. Tachycardia present.      Heart sounds: Murmur heard.      Comments: Large sternotomy scar noted  Pulmonary:      Effort: Pulmonary effort is normal. No respiratory distress.      Breath sounds: No wheezing, rhonchi or rales.   Musculoskeletal:      Right lower leg: No edema.      Left lower leg: No edema.   Skin:     General: Skin is warm and dry.      Capillary Refill: Capillary refill takes 2 to 3 seconds.   Neurological:      General: No focal deficit present.      Mental Status: He is disoriented.   Psychiatric:      Comments: Appears calm      Review of Systems    Vents:  Vent Mode: A/C (02/13/23 0815)  Ventilator Initiated: Yes (02/12/23 1450)  Set Rate: 24 BPM (02/13/23 0815)  Vt Set: 350 mL (02/13/23 0815)  PEEP/CPAP: 5 cmH20 (02/13/23 0815)  Oxygen Concentration (%): 30 (02/14/23  0645)  Peak Airway Pressure: 19 cmH20 (02/13/23 0815)  Plateau Pressure: 0 cmH20 (02/13/23 0815)  Total Ve: 8.18 L/m (02/13/23 0815)  Negative Inspiratory Force (cm H2O): 0 (02/13/23 0815)  F/VT Ratio<105 (RSBI): (!) 74.53 (02/13/23 0815)    Lines/Drains/Airways       Drain  Duration                  Urethral Catheter 02/12/23 1400 Temperature probe;Straight-tip 16 Fr. 1 day              Peripheral Intravenous Line  Duration                  Peripheral IV - Single Lumen 02/12/23 1330 20 G Left;Posterior Wrist 1 day         Peripheral IV - Single Lumen 02/12/23 1531 22 G Anterior;Left;Proximal Forearm 1 day         Peripheral IV - Single Lumen 02/12/23 1532 18 G Right 1 day         Peripheral IV - Single Lumen 02/12/23 1533 22 G Posterior;Right Forearm 1 day         External Jugular IV 02/13/23 1900 <1 day                    Significant Labs:    CBC/Anemia Profile:  Recent Labs   Lab 02/12/23  1414 02/12/23  1726 02/13/23  0336 02/14/23  0837   WBC 23.42*  --  13.24* 11.71   HGB 15.0  --  13.8* 15.2   HCT 45.3 42 38.7* 44.7     --  186 191   MCV 92  --  90 89   RDW 15.4*  --  15.4* 16.6*        Chemistries:  Recent Labs   Lab 02/13/23  0337 02/14/23  0011 02/14/23  0912    144 140   K 3.6 2.8* 5.5*    108 108   CO2 22* 24 22*   BUN 26* 21* 22*   CREATININE 1.6* 1.3 1.3   CALCIUM 8.7 9.4 9.5   ALBUMIN 3.4* 3.3* 3.3*   PROT 7.7 7.3 8.4   BILITOT 0.9 0.8 0.7   ALKPHOS 59 67 70   ALT 19 19 22   AST 33 30 46*   MG 1.6 1.7 2.4   PHOS 3.5 2.4* 4.0       All pertinent labs within the past 24 hours have been reviewed.    Significant Imaging:  I have reviewed all pertinent imaging results/findings within the past 24 hours.

## 2023-02-14 NOTE — ASSESSMENT & PLAN NOTE
Patient with Hypercapnic Respiratory failure which is Acute.  he is not on home oxygen. Supplemental oxygen was provided and noted- Oxygen Concentration (%):  [1-30] 30  Resp Rate Total:  [22 br/min] 22 br/min.   Signs/symptoms of respiratory failure include- respiratory distress. Contributing diagnoses includes - Drug overdose Labs and images were reviewed. Patient Has recent ABG, which has been reviewed. Will treat underlying causes and adjust management of respiratory failure as follows    Patient with some hypercapnic respiratory failure, seizure like activity, and inability to protect his airway. UDS positive for THC and cocaine with pinpoint pupils that did not respond to Narcan. Extubated successfully 2/13. He remains intermittently somnolent but is now on RA and unlikely to need NIV any more.  - NC as needed  - appears to have improving sensorium daily and will no longer need support

## 2023-02-14 NOTE — PLAN OF CARE
Problem: Occupational Therapy  Goal: Occupational Therapy Goal  Description: Goals to be met by: 2/28/2023     Patient will increase functional independence with ADLs by performing:    UE Dressing with Minimal Assistance.  LE Dressing with Minimal Assistance.  Grooming while standing at sink with Contact Guard Assistance.  Toileting from bedside commode with Moderate Assistance for hygiene and clothing management.   Step transfer with Minimal Assistance  Toilet transfer to bedside commode with Moderate Assistance.  Pt will follow 100% of simple one step commands.  Pt will answer 3/3 orientation questions accurately.     Outcome: Ongoing, Progressing     OT evaluation complete and POC established.  Post acute therapy services are recommended.  Anticipate SNF; however, will continue to assess pending pt progress.    GARRY Rodriguez  2/14/2023

## 2023-02-14 NOTE — CARE UPDATE
02/13/23 2148   Preset CPAP/BiPAP Settings   Mode Of Delivery BiPAP S/T   $ CPAP/BiPAP Daily Charge BiPAP/CPAP Daily   $ Initial CPAP/BiPAP Setup? No   $ Is patient using? Yes   Size of Mask Large   Sized Appropriately? Yes   Equipment Type V60   Airway Device Type large full face mask   Ipap 10   EPAP (cm H2O) 5   Pressure Support (cm H2O) 5   Set Rate (Breaths/Min) 10   ITime (sec) 0.9   Rise Time (sec) 3   Patient CPAP/BiPAP Settings   RR Total (Breaths/Min) 26   Tidal Volume (mL) 455   VE Minute Ventilation (L/min) 11.2 L/min   Peak Inspiratory Pressure (cm H2O) 10   TiTOT (%) 40   Total Leak (L/Min) 10   Patient Trigger - ST Mode Only (%) 100

## 2023-02-14 NOTE — PT/OT/SLP EVAL
Physical Therapy Evaluation    Patient Name:  Alejandro Farrar   MRN:  [id number]    Recommendations:     Discharge Recommendations: nursing facility, skilled (anticipating SNF, depending on patient progression)   Discharge Equipment Recommendations:  (TBD pending progress)   Barriers to discharge:  unable to determined due to patient's cognitive deficits    Assessment:     Alejandro Farrar is a 72 y.o. male admitted with a medical diagnosis of Hypertensive emergency.  PMHx of metabolic encephalopathy, acute renal failure, HTN. He presents with the following impairments/functional limitations: weakness, impaired endurance, impaired self care skills, impaired functional mobility, gait instability, impaired balance, impaired cognition, decreased coordination, decreased lower extremity function, decreased upper extremity function, decreased safety awareness, abnormal tone, decreased ROM, impaired coordination, impaired fine motor.    Patient evaluated by PT and goals established. Patient lethargic with significant cognitive deficits. He needed verbal and tactile cues with performing different tasks. Patient required mod-max assistance with bed mobility, and minimum assistance with transfers and taking some steps @ EOB.  Recommendation for dc anticipated to SNF; TBD based on progression. PT will continue to follow and progress as tolerated.     Rehab Prognosis: Fair; patient would benefit from acute skilled PT services to address these deficits and reach maximum level of function.    Recent Surgery: * No surgery found *      Plan:     During this hospitalization, patient to be seen 5 x/week to address the identified rehab impairments via gait training, therapeutic activities, therapeutic exercises, neuromuscular re-education and progress toward the following goals:    Plan of Care Expires:  03/16/23    Subjective     Chief Complaint: none   Patient/Family Comments/goals: Unable to assess due to patient's cognitive  "deficit.  Pain/Comfort:  Pain Rating 1: 0/10  Pain Rating Post-Intervention 1: 0/10    Patients cultural, spiritual, Christian conflicts given the current situation: no    Living Environment:  Patient is poor historian due to cognitive deficits. Based on conversation, SSH with his mom.    Prior to admission, patients level of function was independent (patient poor historian to determine accuracy).  Equipment used at home: none.  DME owned (not currently used): none and unsure .  Upon discharge, patient will have assistance from no one.    Objective:     Communicated with nurse prior to session.  Patient found HOB elevated with bed alarm, blood pressure cuff, lea catheter, pulse ox (continuous), telemetry, peripheral IV  upon PT entry to room.    General Precautions: Standard, fall  Orthopedic Precautions:N/A   Braces: N/A  Respiratory Status: Room air    Exams:  Cognition:   Patient is oriented to Aox2  Patient states his name is Alejandro, knows he at the hospital  Unable to provide an accurate birth date and time.  Stated "Viral" is the current president of the [place]  Pt follows approximately 25% of verbal commands.    Mood: Lethargic, requiring repeated verbal cues  Safety Awareness: Fair  Musculoskeletal:  BMI: 22.52  Posture:  Forward trunk and head  LE ROM/Strength: Unable to perform an accurate assessment due to patient's cognitive level.   R ROM: WFL  L ROM: WFL  R Strength:   Knee extension: 3/5  Dorsiflexion: 3/5   L Strength:   Knee extension: 3/5  Dorsiflexion: 3/5   Neuromuscular:  Tone/Reflexes: No impairments identified with functional mobility. No formal testing performed.   Visual-vestibular: No impairments identified with functional mobility. No formal testing performed.  Integument: Visible skin intact  Cardiopulmonary:  Vital signs:   Supine: 134/72 BP; 114HR  Seated @ EOB: 148/84 BP, 105 HR  Standin/68 BP; 118, HR  Seated @ EOB: 129/87 BP, 113 HR  SpO2 maintained > 95% throughout  Edema: " none noted    Functional Mobility:  Bed Mobility:     Rolling Left:  moderate assistance, maximal assistance, and of 2 persons  Demonstrated slow, steady LE movement but asked for assistance to complete task with trunk support.  Scooting: maximal assistance and of 2 persons  Patient needed assistance by using padding to push him closer to EOB.  Supine to Sit: maximal assistance and of 2 persons  Patient needed both UE and LE support to lift his trunk forward and legs over the bed.  Sit to Supine: moderate assistance and of 2 persons  Patient required consistent cueing to lie back down on his pillow; with assistance to lift his LE back on bed.  Transfers:     Sit to Stand:  contact guard assistance, minimum assistance, and of 2 persons with rolling walker  Gait:   Only able to perform a couple of steps forward, but no foot clearance; patient did more sliding along floor than take a step.  Demonstrated backward and lateral stepping along bedside with contact guard assistance and 2 persons with rolling walker. Patient required consistent verbal cueing to achieve task.      AM-PAC 6 CLICK MOBILITY  Total Score:11       Treatment & Education:  Performed bed mobility and sit<>stand transfers  Cued patient on hand, trunk and foot placement for rolling, scooting and supine<>sit  Provided verbal and tactile cueing with hand placement using rolling walker   Performed standing balance and gait  Consistent verbal  and tactile cueing for steps using rolling walker    Patient left HOB elevated with all lines intact, call button in reach, bed alarm on, and nurse notified.    GOALS:   Multidisciplinary Problems       Physical Therapy Goals          Problem: Physical Therapy    Goal Priority Disciplines Outcome Goal Variances Interventions   Physical Therapy Goal     PT, PT/OT Ongoing, Progressing     Description: Goals to be met by: 3/16/2023    Patient will increase functional independence with mobility by performin.  Sit<>stand with supervision with rolling walker.  2. Gait x15 ft with contact guard assistance with rolling walker.  3. Able to follow 50% of one step commands.  4. Standing balance x2mins with contact guard assistance.                           History:     No past medical history on file.    No past surgical history on file.    Time Tracking:     PT Received On: 02/14/23  PT Start Time: 1005     PT Stop Time: 1038  PT Total Time (min): 33 min     Billable Minutes: Evaluation 20 and Therapeutic Activity 13    Overlap with OT for portions of session due to complex nature of patient, tolerance for therapeutic modalities, and safety with mobility to decrease fall risk for patient and caregiver injury requiring two skilled therapists to provide interventions.    Co-Evaluation  02/14/2023

## 2023-02-14 NOTE — PLAN OF CARE
Plan of care reviewed with the patient, on BIPAP overnight, nicardipine titrated and ceased in the morning, hydralazine given once, electrolyte replacement started with drips, safety maintained, call light in reach .    Problem: Fluid and Electrolyte Imbalance (Acute Kidney Injury/Impairment)  Goal: Fluid and Electrolyte Balance  Outcome: Ongoing, Progressing     Problem: Oral Intake Inadequate (Acute Kidney Injury/Impairment)  Goal: Optimal Nutrition Intake  Outcome: Ongoing, Progressing     Problem: Fall Injury Risk  Goal: Absence of Fall and Fall-Related Injury  Outcome: Ongoing, Progressing

## 2023-02-14 NOTE — PT/OT/SLP EVAL
Occupational Therapy   Evaluation & Treatment    Name: Migdalia Dickerson  MRN: [id number]  Admitting Diagnosis: Hypertensive emergency  Recent Surgery: * No surgery found *      Recommendations:     Discharge Recommendations: nursing facility, skilled (anticipate SNF; will continue to assess pending pt progress)  Discharge Equipment Recommendations:   (TBD pending progress)  Barriers to discharge:  Decreased caregiver support (current functiona level)    Assessment:     Migdalia Dickerson is a 62 y.o. male with a medical diagnosis of Hypertensive emergency.  He presents with confusion and fatigue. Performance deficits affecting function: weakness, impaired balance, decreased safety awareness, impaired endurance, impaired self care skills, impaired functional mobility, edema, decreased upper extremity function, decreased lower extremity function, gait instability, decreased coordination, impaired cognition, impaired fine motor, impaired coordination.  Pt agreeable to participating in therapy upon arrival to room.  Pt demonstrates strength and ROM in (B) UE needed for ADLs that is WFL.  Pt required Min A-Total A to don socks while seated at EOB. Pt able to perform sit <> stand transfer with Min A-CGA RW, and assist of 2.  Difficulty taking steps and sequencing noted during functional mobility requiring max cues and Mod A-Max A, RW, with assist of 2.    Overall, pt tolerated therapy well; however, somewhat lethargic and frequently closed eyes requiring repetition of commands and verbal cues.  Pt able to follow ~25% of simple one step commands.  Unclear as to PLOF as pt is unreliable historian.  Pt would benefit from skilled OT services to address problems listed above and increase independence with ADLs.  Pt will likely require post acute therapy services.  SNF (pending pt progress) is recommended upon d/c from acute care to further address deficits and help pt improve overall functional independence.              Rehab Prognosis: Good; patient would benefit from acute skilled OT services to address these deficits and reach maximum level of function.       Plan:     Patient to be seen 5 x/week to address the above listed problems via self-care/home management, therapeutic activities, therapeutic exercises, neuromuscular re-education, cognitive retraining  Plan of Care Expires: 03/16/23  Plan of Care Reviewed with: patient    Subjective     Chief Complaint: None stated  Patient/Family Comments/goals: Pt unable to state    Occupational Profile: Pt poor historian due to cognitive deficits.  Pt reports living with his mom in [facility], and being (I) with ADLs and mobility.  No DME.    *Per RN note pt's mother passed away in 2007.  *Will continue to assess and obtain information.    Pain/Comfort:  Pain Rating 1: 0/10  Pain Rating Post-Intervention 1: 0/10    Patients cultural, spiritual, Worship conflicts given the current situation: no    Objective:     Communicated with: RN (Pennie) prior to session.  Patient found HOB elevated with lea catheter, peripheral IV, pulse ox (continuous), telemetry, blood pressure cuff, bed alarm (external jugular IV) upon OT entry to room.    General Precautions: Standard, fall  Orthopedic Precautions: N/A  Braces: N/A  Respiratory Status: Room air    Occupational Performance:    Bed Mobility:    Supine <> sit: Max A with assist of 2  Scooting: Max A with assist of 2 seated towards EOB  Increased time and cues required for initiation   Sit <> supine: Max A with assist of 2  Increased time and cues required for initiation  Rolling to left :Mod A    Functional Mobility/Transfers:  Sit <> Stand: CGA-Min A with assist of 2 x 2 trials from EOB  Functional Mobility: Pt took ~4 steps forward with Mod A-Max A, RW, and assist of 2. Pt then took ~4 side steps to left with Mod A-Max A, RW and assist of 2.  Pt walked in shuffling pattern  Unable to fully clear feet  Difficulty shifting weight noted  Assist  "required for RW management    Activities of Daily Living:  Lower Body Dressing:   Min A for donning sock on right foot.  Pt leaned forward for task.  Increased time and several attempts required for task initiation a  Total A for donning sock on left foot while seated at EOB.    Cognitive/Visual Perceptual:  Cognitive/Psychosocial Skills:    -       Oriented to: Person (stated name was Alejandro) and knew he was in hospital.  Stated current president was Viral and date was "the 21st"  -       Follows Commands/attention: ~25% of simple one step commands  -       Communication: clear, fluent; increased cues required to answer questions  -       Memory: Deficits noted  -       Safety awareness/insight to disability: Impaired   -       Mood/Affect/Coping skills/emotional control: lethargic, closed eyes frequently, requiring repetition of commands and increased verbal cues    Physical Exam:  Postural examination/scapula alignment:   -       Rounded shoulders  -       Forward head  Skin integrity: Visible skin intact  Edema:  Moderate in left hand  Sensation: Intact  Motor Planning: WFL  Dominant hand: Right  Upper Extremity Range of Motion:  MMT only performed on RUE 2* swelling noted on LUE  -       Right Upper Extremity: WFL; grossly 3+/5 to 4/5 all muscle groups  -       Left Upper Extremity: WFL  Upper Extremity Strength:   -       Right Upper Extremity: WFL  -       Left Upper Extremity: WFL   Strength: WNL right hand; WFL left hand 2* swelling present  Fine Motor Coordination: mild deficits left hand 2* swelling  Gross motor coordination: Impaired  Balance:  Sitting- SBA-CGA; Standing- SBA-CGA static; Mod A-Max A dynamic  Vision: Unsure; requires further assessment    AMPAC 6 Click ADL:  AMPAC Total Score: 12    Treatment & Education:  *Session focused on promoting increased endurance, strength, balance, coordination, and ROM needed for ADLs and functional transfers as part of daily routine.   -pt educated on role " of OT  -pt performed LB dressing task  -pt performed sit <> stand transfer and took steps forward, back, and to the side; increased cues required for technique and seqencing  -POC reviewed with pt    Patient left HOB elevated with all lines intact, call button in reach, (B) E propped up on pillows and RN (Pennie) notified and sitting outside of room    GOALS:   Multidisciplinary Problems       Occupational Therapy Goals          Problem: Occupational Therapy    Goal Priority Disciplines Outcome Interventions   Occupational Therapy Goal     OT, PT/OT Ongoing, Progressing    Description: Goals to be met by: 2/28/2023     Patient will increase functional independence with ADLs by performing:    UE Dressing with Minimal Assistance.  LE Dressing with Minimal Assistance.  Grooming while standing at sink with Contact Guard Assistance.  Toileting from bedside commode with Moderate Assistance for hygiene and clothing management.   Step transfer with Minimal Assistance  Toilet transfer to bedside commode with Moderate Assistance.  Pt will follow 100% of simple one step commands.  Pt will answer 3/3 orientation questions accurately.                          History:     No past medical history on file.    No past surgical history on file.    Time Tracking:     OT Date of Treatment: 02/14/23  OT Start Time: 1000  OT Stop Time: 1038  OT Total Time (min): 38 min    Billable Minutes:Evaluation 20  Self Care/Home Management 18    *co-tx with PT 2* pt requires increased level of skilled assist    GARRY Rodriguez  2/14/2023

## 2023-02-14 NOTE — NURSING
Patient is unable to see and denies past eye medical history of blindness and glaucoma. MD aware and unable to reach sister for past medical history.     Patient asked often where is his mother, and states he came with her.  Mother is  in  according to obituary and was able to contact sister [place] via contact [employer].   Sister stated she would come today but has not so far.    Will continue to monitor

## 2023-02-14 NOTE — ASSESSMENT & PLAN NOTE
Patient off Cardene drip. Now with PO and SC anti-hypertensive.  - continue oral medications with IV pushes as needed

## 2023-02-15 LAB
ALBUMIN SERPL BCP-MCNC: 3 G/DL (ref 3.5–5.2)
ALP SERPL-CCNC: 63 U/L (ref 55–135)
ALT SERPL W/O P-5'-P-CCNC: 15 U/L (ref 10–44)
ANION GAP SERPL CALC-SCNC: 10 MMOL/L (ref 8–16)
ANION GAP SERPL CALC-SCNC: 9 MMOL/L (ref 8–16)
AST SERPL-CCNC: 22 U/L (ref 10–40)
BACTERIA BLD CULT: ABNORMAL
BASOPHILS # BLD AUTO: 0.05 K/UL (ref 0–0.2)
BASOPHILS NFR BLD: 0.5 % (ref 0–1.9)
BILIRUB SERPL-MCNC: 0.8 MG/DL (ref 0.1–1)
BUN SERPL-MCNC: 22 MG/DL (ref 8–23)
BUN SERPL-MCNC: 23 MG/DL (ref 8–23)
CALCIUM SERPL-MCNC: 10 MG/DL (ref 8.7–10.5)
CALCIUM SERPL-MCNC: 9 MG/DL (ref 8.7–10.5)
CHLORIDE SERPL-SCNC: 106 MMOL/L (ref 95–110)
CHLORIDE SERPL-SCNC: 108 MMOL/L (ref 95–110)
CO2 SERPL-SCNC: 24 MMOL/L (ref 23–29)
CO2 SERPL-SCNC: 28 MMOL/L (ref 23–29)
CREAT SERPL-MCNC: 1.2 MG/DL (ref 0.5–1.4)
CREAT SERPL-MCNC: 1.5 MG/DL (ref 0.5–1.4)
DIFFERENTIAL METHOD: ABNORMAL
EOSINOPHIL # BLD AUTO: 0.1 K/UL (ref 0–0.5)
EOSINOPHIL NFR BLD: 1.1 % (ref 0–8)
ERYTHROCYTE [DISTWIDTH] IN BLOOD BY AUTOMATED COUNT: 15.3 % (ref 11.5–14.5)
EST. GFR  (NO RACE VARIABLE): 50 ML/MIN/1.73 M^2
EST. GFR  (NO RACE VARIABLE): >60 ML/MIN/1.73 M^2
GLUCOSE SERPL-MCNC: 101 MG/DL (ref 70–110)
GLUCOSE SERPL-MCNC: 107 MG/DL (ref 70–110)
HCT VFR BLD AUTO: 41.4 % (ref 40–54)
HGB BLD-MCNC: 14.4 G/DL (ref 14–18)
IMM GRANULOCYTES # BLD AUTO: 0.03 K/UL (ref 0–0.04)
IMM GRANULOCYTES NFR BLD AUTO: 0.3 % (ref 0–0.5)
LYMPHOCYTES # BLD AUTO: 1.1 K/UL (ref 1–4.8)
LYMPHOCYTES NFR BLD: 10 % (ref 18–48)
MAGNESIUM SERPL-MCNC: 1.9 MG/DL (ref 1.6–2.6)
MAGNESIUM SERPL-MCNC: 2.3 MG/DL (ref 1.6–2.6)
MAGNESIUM SERPL-MCNC: 2.3 MG/DL (ref 1.6–2.6)
MCH RBC QN AUTO: 30.4 PG (ref 27–31)
MCHC RBC AUTO-ENTMCNC: 34.8 G/DL (ref 32–36)
MCV RBC AUTO: 87 FL (ref 82–98)
MONOCYTES # BLD AUTO: 0.6 K/UL (ref 0.3–1)
MONOCYTES NFR BLD: 5.2 % (ref 4–15)
NEUTROPHILS # BLD AUTO: 9.1 K/UL (ref 1.8–7.7)
NEUTROPHILS NFR BLD: 82.9 % (ref 38–73)
NRBC BLD-RTO: 0 /100 WBC
PHOSPHATE SERPL-MCNC: 2.3 MG/DL (ref 2.7–4.5)
PLATELET # BLD AUTO: 152 K/UL (ref 150–450)
PMV BLD AUTO: 10.8 FL (ref 9.2–12.9)
POTASSIUM SERPL-SCNC: 2.9 MMOL/L (ref 3.5–5.1)
POTASSIUM SERPL-SCNC: 3.7 MMOL/L (ref 3.5–5.1)
PROT SERPL-MCNC: 6.9 G/DL (ref 6–8.4)
RBC # BLD AUTO: 4.74 M/UL (ref 4.6–6.2)
SODIUM SERPL-SCNC: 142 MMOL/L (ref 136–145)
SODIUM SERPL-SCNC: 143 MMOL/L (ref 136–145)
WBC # BLD AUTO: 10.93 K/UL (ref 3.9–12.7)

## 2023-02-15 PROCEDURE — 63600175 PHARM REV CODE 636 W HCPCS: Performed by: HOSPITALIST

## 2023-02-15 PROCEDURE — 11000001 HC ACUTE MED/SURG PRIVATE ROOM

## 2023-02-15 PROCEDURE — 85025 COMPLETE CBC W/AUTO DIFF WBC: CPT | Performed by: HOSPITALIST

## 2023-02-15 PROCEDURE — 99233 SBSQ HOSP IP/OBS HIGH 50: CPT | Mod: ,,, | Performed by: INTERNAL MEDICINE

## 2023-02-15 PROCEDURE — 63600175 PHARM REV CODE 636 W HCPCS: Performed by: INTERNAL MEDICINE

## 2023-02-15 PROCEDURE — 94761 N-INVAS EAR/PLS OXIMETRY MLT: CPT

## 2023-02-15 PROCEDURE — 80048 BASIC METABOLIC PNL TOTAL CA: CPT | Mod: XB | Performed by: INTERNAL MEDICINE

## 2023-02-15 PROCEDURE — 36415 COLL VENOUS BLD VENIPUNCTURE: CPT | Performed by: INTERNAL MEDICINE

## 2023-02-15 PROCEDURE — 80053 COMPREHEN METABOLIC PANEL: CPT | Performed by: HOSPITALIST

## 2023-02-15 PROCEDURE — 97535 SELF CARE MNGMENT TRAINING: CPT

## 2023-02-15 PROCEDURE — 63600175 PHARM REV CODE 636 W HCPCS: Performed by: PHYSICIAN ASSISTANT

## 2023-02-15 PROCEDURE — 83735 ASSAY OF MAGNESIUM: CPT | Mod: 91 | Performed by: INTERNAL MEDICINE

## 2023-02-15 PROCEDURE — 25000003 PHARM REV CODE 250: Performed by: PHYSICIAN ASSISTANT

## 2023-02-15 PROCEDURE — 99900035 HC TECH TIME PER 15 MIN (STAT)

## 2023-02-15 PROCEDURE — 94660 CPAP INITIATION&MGMT: CPT

## 2023-02-15 PROCEDURE — C9113 INJ PANTOPRAZOLE SODIUM, VIA: HCPCS | Performed by: HOSPITALIST

## 2023-02-15 PROCEDURE — 97530 THERAPEUTIC ACTIVITIES: CPT

## 2023-02-15 PROCEDURE — 99233 PR SUBSEQUENT HOSPITAL CARE,LEVL III: ICD-10-PCS | Mod: ,,, | Performed by: INTERNAL MEDICINE

## 2023-02-15 PROCEDURE — 25000003 PHARM REV CODE 250: Performed by: INTERNAL MEDICINE

## 2023-02-15 PROCEDURE — 84100 ASSAY OF PHOSPHORUS: CPT | Performed by: HOSPITALIST

## 2023-02-15 PROCEDURE — 83735 ASSAY OF MAGNESIUM: CPT | Performed by: HOSPITALIST

## 2023-02-15 PROCEDURE — 25000003 PHARM REV CODE 250: Performed by: HOSPITALIST

## 2023-02-15 RX ORDER — MAGNESIUM SULFATE 1 G/100ML
1 INJECTION INTRAVENOUS ONCE
Status: COMPLETED | OUTPATIENT
Start: 2023-02-15 | End: 2023-02-15

## 2023-02-15 RX ORDER — AZELASTINE 1 MG/ML
1 SPRAY, METERED NASAL 2 TIMES DAILY
Status: DISCONTINUED | OUTPATIENT
Start: 2023-02-15 | End: 2023-02-17 | Stop reason: HOSPADM

## 2023-02-15 RX ADMIN — HEPARIN SODIUM 5000 UNITS: 5000 INJECTION INTRAVENOUS; SUBCUTANEOUS at 05:02

## 2023-02-15 RX ADMIN — HEPARIN SODIUM 5000 UNITS: 5000 INJECTION INTRAVENOUS; SUBCUTANEOUS at 10:02

## 2023-02-15 RX ADMIN — CHLORHEXIDINE GLUCONATE 0.12% ORAL RINSE 15 ML: 1.2 LIQUID ORAL at 08:02

## 2023-02-15 RX ADMIN — MAGNESIUM SULFATE 1 G: 1 INJECTION INTRAVENOUS at 05:02

## 2023-02-15 RX ADMIN — NIFEDIPINE 60 MG: 30 TABLET, FILM COATED, EXTENDED RELEASE ORAL at 08:02

## 2023-02-15 RX ADMIN — MUPIROCIN: 20 OINTMENT TOPICAL at 08:02

## 2023-02-15 RX ADMIN — AZELASTINE HYDROCHLORIDE 137 MCG: 137 SPRAY, METERED NASAL at 02:02

## 2023-02-15 RX ADMIN — CEFTRIAXONE 1 G: 1 INJECTION, POWDER, FOR SOLUTION INTRAMUSCULAR; INTRAVENOUS at 08:02

## 2023-02-15 RX ADMIN — AZELASTINE HYDROCHLORIDE 137 MCG: 137 SPRAY, METERED NASAL at 08:02

## 2023-02-15 RX ADMIN — CEFEPIME HYDROCHLORIDE 2 G: 2 INJECTION, SOLUTION INTRAVENOUS at 05:02

## 2023-02-15 RX ADMIN — LORAZEPAM 2 MG: 2 INJECTION INTRAMUSCULAR; INTRAVENOUS at 08:02

## 2023-02-15 RX ADMIN — HEPARIN SODIUM 5000 UNITS: 5000 INJECTION INTRAVENOUS; SUBCUTANEOUS at 02:02

## 2023-02-15 RX ADMIN — LORAZEPAM 2 MG: 2 INJECTION INTRAMUSCULAR; INTRAVENOUS at 05:02

## 2023-02-15 RX ADMIN — POTASSIUM BICARBONATE 50 MEQ: 978 TABLET, EFFERVESCENT ORAL at 05:02

## 2023-02-15 RX ADMIN — POTASSIUM BICARBONATE 50 MEQ: 978 TABLET, EFFERVESCENT ORAL at 08:02

## 2023-02-15 RX ADMIN — PANTOPRAZOLE SODIUM 40 MG: 40 INJECTION, POWDER, FOR SOLUTION INTRAVENOUS at 08:02

## 2023-02-15 NOTE — ASSESSMENT & PLAN NOTE
- Hypertensive emergency with end-organ damage with systolic pressures to the 240s.  - Encephalopathy likely multifactorial with potential stroke, seizure, hypertension, sepsis, and toxins all potential contributors.  - CT Head without acute infarct; UTox positive for cocaine, marijuana.  - Hypothermic in addition initially though was found down outdoors.  - Significant continued improvement in mentation. Able to state name - will discuss with CM to assist with locating family.   - Continue empiric cefepime, vancomycin for time being.  - Weaning from nicardipine gtt. Nursing swallow screen; if passes will start diet, nifedipine 60mg PO daily. Titrate to effect.  - Check U/S LUE with significant unilateral swelling.  - Appreciate pulm/crit assistance.

## 2023-02-15 NOTE — PLAN OF CARE
Plan of care reviewed with the patient, NSR overnight, making eye contacts, disoriented and tried to get off of the bed this morning, states that he want to go to the school, reoriented back, K+ and Mg++ replacement started, safety maintained, call light in reach, monitor for changes in neuro status.     Problem: Infection  Goal: Absence of Infection Signs and Symptoms  Outcome: Ongoing, Progressing     Problem: Nutrition Impairment (Mechanical Ventilation, Invasive)  Goal: Optimal Nutrition Delivery  Outcome: Ongoing, Progressing     Problem: Adult Inpatient Plan of Care  Goal: Optimal Comfort and Wellbeing  Outcome: Ongoing, Progressing     Problem: Fluid and Electrolyte Imbalance (Acute Kidney Injury/Impairment)  Goal: Fluid and Electrolyte Balance  Outcome: Ongoing, Progressing

## 2023-02-15 NOTE — PT/OT/SLP PROGRESS
Physical Therapy Treatment    Patient Name:  Alejandro Farrar   MRN:  [id number]    Recommendations:     Discharge Recommendations: nursing facility, skilled  Discharge Equipment Recommendations: walker, rolling, bedside commode  Barriers to discharge: Inaccessible home and Decreased caregiver support    Assessment:     Alejandro Farrar is a 72 y.o. male admitted with a medical diagnosis of Hypertensive emergency.  He presents with the following impairments/functional limitations: weakness, impaired endurance, impaired self care skills, impaired functional mobility, gait instability, impaired balance, impaired cognition, decreased coordination, decreased lower extremity function, decreased safety awareness, decreased ROM.    Patient tolerated PT treatment well today. Despite being sleepy and fatigued, Mr. Farrar was able to remain motivated to perform his activities with verbal cueing. He demonstrated sit<>stand transfers with contact guard assistance. Ambulated x30 feet with and without AD. Patient presents with gait and balance instability and remains a high fall risk. Recommendation for dc for SNF. TBD based on progression. PT will continue to follow and progress as tolerated.       Rehab Prognosis: Good; patient would benefit from acute skilled PT services to address these deficits and reach maximum level of function.    Recent Surgery: * No surgery found *      Plan:     During this hospitalization, patient to be seen 5 x/week to address the identified rehab impairments via gait training, therapeutic activities, therapeutic exercises, neuromuscular re-education and progress toward the following goals:    Plan of Care Expires:  03/16/23    Subjective     Chief Complaint: Fatigue  Patient/Family Comments/goals: Sister Carolina was in his room at the start of the treatment. She stated she is the youngest (Mr. Farrar is now the oldest), but feels like the adult now. Mr. Farrar laughed at her comment. Patient wants to leave  the hospital. Patient agreeable to PT treatment.  Pain/Comfort:  Pain Rating 1: 0/10  Pain Rating Post-Intervention 1: 0/10      Objective:     Communicated with nurse prior to session.  Patient found up in chair with bed alarm, blood pressure cuff, lea catheter, pulse ox (continuous), telemetry, peripheral IV upon PT entry to room.     General Precautions: Standard, fall  Orthopedic Precautions: N/A  Braces: N/A  Respiratory Status: Room air     Functional Mobility:  Transfers:     Sit to Stand (from chair):  contact guard assistance with rolling walker  Gait:   Ambulated 30ft x2 with contact guard assistance without AD; and some minimum assistance when patient had LOB a couple of times. Patient began to feel dizzy towards 30 ft. Switched to AD.  Ambulated 30 ft with contact guard assistance with rolling walker  Demonstrated step-to gait pattern with reduced step clearance, step length and tomas. Step clearance, step length decreased further towards 30 ft. Patient transferred quickly into chair; requiring him to scoot back to get in proper position.   Gait Speed: .155 m/s (11.8 sec)   <0.2m/s - likely to d/c to post-acute care  Normal gait speed for average healthy adult: 1.2m/s-1.4m/s       AM-PAC 6 CLICK MOBILITY  Turning over in bed (including adjusting bedclothes, sheets and blankets)?: 3  Sitting down on and standing up from a chair with arms (e.g., wheelchair, bedside commode, etc.): 3  Moving from lying on back to sitting on the side of the bed?: 3  Moving to and from a bed to a chair (including a wheelchair)?: 3  Need to walk in hospital room?: 3  Climbing 3-5 steps with a railing?: 2  Basic Mobility Total Score: 17       Treatment & Education:  TA:  Performed sit<>stand transfers  Cued patient to grab arm chair handles instead of rolling walker  Cued patient to correct trunk and head posture upon standing  Performed ambulation   Cued patient on hand placement with rolling walker  Cued patient on  direction and gait speed      Patient left up in chair with all lines intact and nurse notified.    GOALS:   Multidisciplinary Problems       Physical Therapy Goals          Problem: Physical Therapy    Goal Priority Disciplines Outcome Goal Variances Interventions   Physical Therapy Goal     PT, PT/OT Ongoing, Progressing     Description: Goals to be met by: 3/16/2023    Patient will increase functional independence with mobility by performin. Sit<>stand with supervision with rolling walker.  2. Gait x 50 feet with contact guard assistance with rolling walker.  3. Standing balance x2mins with contact guard assistance.                           Time Tracking:     PT Received On: 02/15/23  PT Start Time: 1420     PT Stop Time: 1443  PT Total Time (min): 23 min     Billable Minutes: Therapeutic Activity 23    Treatment Type: Treatment  PT/PTA: PT     PTA Visit Number: 0     02/15/2023

## 2023-02-15 NOTE — PLAN OF CARE
Problem: Occupational Therapy  Goal: Occupational Therapy Goal  Description: Goals to be met by: 2/28/2023     Patient will increase functional independence with ADLs by performing:    UE Dressing with Supervision.  LE Dressing with Minimal Assistance.  Grooming while standing at sink with Contact Guard Assistance.  Toileting from bedside commode with Moderate Assistance for hygiene and clothing management.   Step transfer with Minimal Assistance  Toilet transfer to bedside commode with Moderate Assistance.  Pt will follow 100% of simple one step commands.  Pt will answer 3/3 orientation questions accurately.     Completed Goals:   UE Dressing with Minimal Assistance.  MET 2/15/2023  Outcome: Ongoing, Progressing     Pt met one goal this date and is making progress towards others.  SNF is recommended upon d/c from acute care to further address deficits and help pt improve overall functional independence.     GARRY Rodriguez  2/15/2023

## 2023-02-15 NOTE — PLAN OF CARE
Patient AAOx3, sister at bedside.Patient uses cane to ambulate.     Patient will need PCP on discharge. Will require transportation home.   02/15/23 1537   Discharge Assessment   Assessment Type Discharge Planning Assessment   Confirmed/corrected address, phone number and insurance Yes   Confirmed Demographics Correct on Facesheet   Source of Information patient;family   Communicated VINCE with patient/caregiver Date not available/Unable to determine   People in Home alone   Do you expect to return to your current living situation? Yes   Do you have help at home or someone to help you manage your care at home? Yes   Who are your caregiver(s) and their phone number(s)? Carolina Farrar (Sister)   [phone] (Mobile)   Prior to hospitilization cognitive status: Alert/Oriented   Current cognitive status: Alert/Oriented   Walking or Climbing Stairs ambulation difficulty, requires equipment   Equipment Currently Used at Home cane, straight   Readmission within 30 days? Yes   Do you currently have service(s) that help you manage your care at home? No   Do you take prescription medications? Yes   Do you have prescription coverage? Yes   Do you have any problems affording any of your prescribed medications? No   Is the patient taking medications as prescribed? yes   How do you get to doctors appointments? other (see comments)  ([employer])   Are you on dialysis? No   Do you take coumadin? No   Discharge Plan A Home with family   DME Needed Upon Discharge  none   Discharge Plan discussed with: Patient;Sibling   Discharge Barriers Identified None   Physical Activity   On average, how many days per week do you engage in moderate to strenuous exercise (like a brisk walk)? 0 days   On average, how many minutes do you engage in exercise at this level? 0 min   Financial Resource Strain   How hard is it for you to pay for the very basics like food, housing, medical care, and heating? Very Hard   Housing Stability   In the last 12 months,  was there a time when you were not able to pay the mortgage or rent on time? N   In the last 12 months, was there a time when you did not have a steady place to sleep or slept in a shelter (including now)? N   Transportation Needs   In the past 12 months, has lack of transportation kept you from medical appointments or from getting medications? no   In the past 12 months, has lack of transportation kept you from meetings, work, or from getting things needed for daily living? No   Food Insecurity   Within the past 12 months, you worried that your food would run out before you got the money to buy more. Often true   Within the past 12 months, the food you bought just didn't last and you didn't have money to get more. Often true   Stress   Do you feel stress - tense, restless, nervous, or anxious, or unable to sleep at night because your mind is troubled all the time - these days? Not at all   Social Connections   In a typical week, how many times do you talk on the phone with family, friends, or neighbors? Never   How often do you get together with friends or relatives? Three times   Do you belong to any clubs or organizations such as Zoroastrian groups, unions, fraternal or athletic groups, or school groups? No   How often do you attend meetings of the clubs or organizations you belong to? Never   Are you , , , , never , or living with a partner? Never marrie   Alcohol Use   Q1: How often do you have a drink containing alcohol? Never   Q2: How many drinks containing alcohol do you have on a typical day when you are drinking? None   Q3: How often do you have six or more drinks on one occasion? Never     [facility] - [facility] ([facility])  Initial Discharge Assessment       Primary Care Provider: Primary Doctor No    Admission Diagnosis: Weakness [R53.1]  Acute respiratory failure with hypoxia [J96.01]  Hypertensive emergency [I16.1]    Admission Date: 2/12/2023  Expected Discharge  Date:     Discharge Barriers Identified: (P) None    Payor: [employer] MANAGED MEDICARE / Plan: [employer] SNP (SPECIAL NEEDS PLAN) / Product Type: Medicare Advantage /     Extended Emergency Contact Information  Primary Emergency Contact: Carolina Farrar  Mobile Phone: [phone]  Relation: Sister  Preferred language: English   needed? No    Discharge Plan A: (P) Home with family       No Pharmacies Listed    Initial Assessment (most recent)       Adult Discharge Assessment - 02/15/23 1537          Discharge Assessment    Assessment Type Discharge Planning Assessment (P)      Confirmed/corrected address, phone number and insurance Yes (P)      Confirmed Demographics Correct on Facesheet (P)      Source of Information patient;family (P)      Communicated VINCE with patient/caregiver Date not available/Unable to determine (P)      People in Home alone (P)      Do you expect to return to your current living situation? Yes (P)      Do you have help at home or someone to help you manage your care at home? Yes (P)      Who are your caregiver(s) and their phone number(s)? Carolina Farrar (Sister)   [phone] (Mobile) (P)      Prior to hospitilization cognitive status: Alert/Oriented (P)      Current cognitive status: Alert/Oriented (P)      Walking or Climbing Stairs ambulation difficulty, requires equipment (P)      Equipment Currently Used at Home cane, straight (P)      Readmission within 30 days? Yes (P)      Do you currently have service(s) that help you manage your care at home? No (P)      Do you take prescription medications? Yes (P)      Do you have prescription coverage? Yes (P)      Do you have any problems affording any of your prescribed medications? No (P)      Is the patient taking medications as prescribed? yes (P)      How do you get to doctors appointments? other (see comments) (P)    [employer]    Are you on dialysis? No (P)      Do you take coumadin? No (P)      Discharge Plan A Home with family (P)       DME Needed Upon Discharge  none (P)      Discharge Plan discussed with: Patient;Sibling (P)      Discharge Barriers Identified None (P)         Physical Activity    On average, how many days per week do you engage in moderate to strenuous exercise (like a brisk walk)? 0 days (P)      On average, how many minutes do you engage in exercise at this level? 0 min (P)         Financial Resource Strain    How hard is it for you to pay for the very basics like food, housing, medical care, and heating? Very hard (P)         Housing Stability    In the last 12 months, was there a time when you were not able to pay the mortgage or rent on time? No (P)      In the last 12 months, was there a time when you did not have a steady place to sleep or slept in a shelter (including now)? No (P)         Transportation Needs    In the past 12 months, has lack of transportation kept you from medical appointments or from getting medications? No (P)      In the past 12 months, has lack of transportation kept you from meetings, work, or from getting things needed for daily living? No (P)         Food Insecurity    Within the past 12 months, you worried that your food would run out before you got the money to buy more. Often true (P)      Within the past 12 months, the food you bought just didn't last and you didn't have money to get more. Often true (P)         Stress    Do you feel stress - tense, restless, nervous, or anxious, or unable to sleep at night because your mind is troubled all the time - these days? Not at all (P)         Social Connections    In a typical week, how many times do you talk on the phone with family, friends, or neighbors? Never (P)      How often do you get together with friends or relatives? Three times a week (P)      Do you belong to any clubs or organizations such as Zoroastrianism groups, unions, fraternal or athletic groups, or school groups? No (P)      How often do you attend meetings of the clubs or  organizations you belong to? Never (P)      Are you , , , , never , or living with a partner? Never  (P)         Alcohol Use    Q1: How often do you have a drink containing alcohol? Never (P)      Q2: How many drinks containing alcohol do you have on a typical day when you are drinking? Patient does not drink (P)      Q3: How often do you have six or more drinks on one occasion? Never (P)

## 2023-02-15 NOTE — PT/OT/SLP PROGRESS
Occupational Therapy   Treatment    Name: Alejandro Farrar  MRN: [id number]  Admitting Diagnosis:  Hypertensive emergency       Recommendations:     Discharge Recommendations: nursing facility, skilled  Discharge Equipment Recommendations:  walker, rolling, bedside commode  Barriers to discharge:  Decreased caregiver support (impaired cognition)    Assessment:     Alejandro Farrar is a 72 y.o. male with a medical diagnosis of Hypertensive emergency.  He presents with no pain. Performance deficits affecting function are weakness, impaired endurance, impaired self care skills, impaired functional mobility, gait instability, impaired balance, impaired cognition, decreased coordination, decreased safety awareness, impaired coordination.  Pt agreeable to participating in therapy upon arrival to room.  Pt demonstrated improvement with UB dressing performing with SBA while seated up in chair.  In addition, pt able to perform sit <> stand transfer from chair with SBA, RW and ambulate short distance in room with CGA, RW.  Cues required for sequencing and RW management.    Overall, pt tolerated therapy well.  Pt more alert this date and able to follow most one step commands; however, continues to be impacted by impaired cognition requiring repetition and cues. Towards end of session pt closed his eyes during exercises.  He met one goal and is making progress towards others.  He would continue to benefit from skilled OT services to address problems listed above and increase independence with ADLs.  SNF is recommended upon d/c from acute care to further address deficits and help pt improve overall functional independence.         Rehab Prognosis:  Good; patient would benefit from acute skilled OT services to address these deficits and reach maximum level of function.       Plan:     Patient to be seen 5 x/week to address the above listed problems via self-care/home management, therapeutic activities, therapeutic exercises  Plan of Care  Expires: 03/16/23  Plan of Care Reviewed with: patient    Subjective     Pt able to state name and date of birth.  States he lives with his mother and needed some help with ADLs PTA.    Pain/Comfort:  Pain Rating 1: 0/10  Pain Rating Post-Intervention 1: 0/10    Objective:     Communicated with: RN prior to session.  Patient found up in chair with chair alarm, lea catheter, telemetry, peripheral IV upon OT entry to room.    General Precautions: Standard, fall    Orthopedic Precautions:N/A  Braces: N/A  Respiratory Status: Room air     Occupational Performance:     Bed Mobility:    Not assessed 2* pt up in chair upon arrival.    Functional Mobility/Transfers:  Sit <> Stand: SBA, RW x 2 trials from chair  Functional Mobility: Pt ambulated ~16 ft in room (8 ft, turn around, 8 ft) with CGA, RW.  Pt picked up RW during task; cues for RW management and sequencing provided  Mild postural instability noted; no LOB observed    Activities of Daily Living:  Grooming: Supervision for washing face with cloth while seated up in chair.  Upper Body Dressing: SBA for doffing/donning gown while seated up in chair.      [facility] 6 Click ADL: 15    Treatment & Education:  *Pt educated on role of OT in acute care setting.  *Pt performed sit <> stand transfer from chair; cues for technique and positioning provided  *Pt performed activity in standing to address dynamic standing balance needed for ADLs:   -pt held onto RW with one hand then reached forward to give OT high five: 1 set x 10 reps on each side; cues for sequencing required 2* attempted to alternate hands.  SBA, RW for balance.  *Pt ambulated within room to address coordination, endurance, and balance needed for functional mobility; cues for sequencing and RW management required  *Pt performed UB exercises to provide stretch and promote increased endurance needed for ADLs: seated up in chair  -chest press: 2 sets x 12 reps  -overhead clap: 2 sets x 10 reps  *POC reviewed with  pt        Patient left up in chair in reclined position with all lines intact, call button in reach, chair alarm on, and RN (Lilia) notified    GOALS:   Multidisciplinary Problems       Occupational Therapy Goals          Problem: Occupational Therapy    Goal Priority Disciplines Outcome Interventions   Occupational Therapy Goal     OT, PT/OT Ongoing, Progressing    Description: Goals to be met by: 2/28/2023     Patient will increase functional independence with ADLs by performing:    UE Dressing with Supervision.  LE Dressing with Minimal Assistance.  Grooming while standing at sink with Contact Guard Assistance.  Toileting from bedside commode with Moderate Assistance for hygiene and clothing management.   Step transfer with Minimal Assistance  Toilet transfer to bedside commode with Moderate Assistance.  Pt will follow 100% of simple one step commands.  Pt will answer 3/3 orientation questions accurately.     Completed Goals:   UE Dressing with Minimal Assistance.  MET 2/15/2023                       Time Tracking:     OT Date of Treatment: 02/15/23  OT Start Time: 1117  OT Stop Time: 1141  OT Total Time (min): 24 min    Billable Minutes:Self Care/Home Management 10  Therapeutic Activity 14    OT/ANABEL: OT          GARRY Rodriguez  2/15/2023

## 2023-02-15 NOTE — PROGRESS NOTES
"[facility] Intensive Care [facility] Medicine  Progress Note    Patient Name: Alejandro Farrar  MRN: [id number]  Patient Class: IP- Inpatient   Admission Date: 2/12/2023  Length of Stay: 3 days  Attending Physician: NEL Sinha MD  Primary Care Provider: Primary Doctor No        Subjective:     Principal Problem:Hypertensive emergency        HPI:  Unknown male was found down in the street.  It was reported that a bystander said his name was "Alejandro" but that person is no longer available to be interviewed.  The fire department reported pinpoint pupils with agonal respiration and he was given Narcan without improvement in his mentation.  On arrival to the hospital, he was noted to be drooling and contracted, and he started having seizure-like activity witnessed by staff.  Physical exam by the ER physician document eye deviation to the left and downward.  He was unable to protect his airway and was intubated.  Noted to be hypothermic with a temperature of 91.6° F, heart rate 131, duration 34, blood pressure 242/144.  Treatment initiated with Cardene drip for blood pressure control and patient was given empiric cefepime and vancomycin after obtaining cultures.  Workup in the ER, remarkable for head CT showed no acute abnormalities but showed "Foci of low attenuation involving the bilateral corona radiata and basal ganglia are suspected to reflect that of remote infarct." U tox remarkable for cocaine and marijuana, BUN and creatinine of 33/2.3, , , troponin 0.049, lactate 5.1 procalcitonin 1.09, serum alcohol negative, WBC 23.42, UA overall not remarkable, chest x-ray without definitive infiltrate, ABG pH 7.184 pCO2 71 PO2 330 bicarb 29.        Overview/Hospital Course:  Unknown male admitted with hypertensive emergency with encephalopathy, acute respiratory failure and acute renal failure with unknown baseline.  Intubated for airway protection, U tox positive for cocaine and marijuana, initial head " CT with no acute finding but possible remote infarct and microvascular ischemic changes.  Initial concern for seizure-like activity reported in the ER and he was noted to be hypothermic.  Empirically started on cefepime and vancomycin, Cardene drip pressure control.      Interval History: No acute events overnight. Discussed with patient and later sister at bedside; feeling much improved.    Review of Systems   Constitutional:  Negative for chills and fever.   Respiratory:  Negative for cough and shortness of breath.    Cardiovascular:  Negative for chest pain and palpitations.   Gastrointestinal:  Negative for abdominal pain and nausea.   Objective:     Vital Signs (Most Recent):  Temp: 98.8 °F (37.1 °C) (02/15/23 1600)  Pulse: 82 (02/15/23 2004)  Resp: (!) 30 (02/15/23 2004)  BP: (!) 144/80 (02/15/23 2004)  SpO2: 95 % (02/15/23 2004)   Vital Signs (24h Range):  Temp:  [97.5 °F (36.4 °C)-98.8 °F (37.1 °C)] 98.8 °F (37.1 °C)  Pulse:  [] 82  Resp:  [8-37] 30  SpO2:  [91 %-99 %] 95 %  BP: (131-156)/(70-97) 144/80     Weight: 71.2 kg (156 lb 15.5 oz)  Body mass index is 22.52 kg/m².    Intake/Output Summary (Last 24 hours) at 2/15/2023 2027  Last data filed at 2/15/2023 0705  Gross per 24 hour   Intake 49.1 ml   Output 760 ml   Net -710.9 ml        Physical Exam  Vitals and nursing note reviewed.   Constitutional:       General: He is not in acute distress.     Appearance: He is well-developed.   HENT:      Head: Normocephalic and atraumatic.   Eyes:      General:         Right eye: No discharge.         Left eye: No discharge.      Conjunctiva/sclera: Conjunctivae normal.   Cardiovascular:      Rate and Rhythm: Normal rate.      Pulses: Normal pulses.      Heart sounds: Murmur heard.   Pulmonary:      Effort: Pulmonary effort is normal. No respiratory distress.   Abdominal:      Palpations: Abdomen is soft.      Tenderness: There is no abdominal tenderness.   Musculoskeletal:         General: Normal range of  "motion.      Right lower leg: No edema.      Left lower leg: No edema.      Comments: LUE edema   Skin:     General: Skin is warm and dry.   Neurological:      Mental Status: He is alert.      Comments: Oriented x 1 (name, "hospital," "2019")     Significant Labs:   CBC:  Recent Labs   Lab 02/13/23  0336 02/14/23  0837 02/15/23  0333   WBC 13.24* 11.71 10.93   HGB 13.8* 15.2 14.4   HCT 38.7* 44.7 41.4    191 152   GRAN 84.9*  11.3* 88.6*  10.4* 82.9*  9.1*   LYMPH 6.4*  0.9* 5.8*  0.7* 10.0*  1.1   MONO 7.6  1.0 4.9  0.6 5.2  0.6   EOS 0.0 0.0 0.1   BASO 0.05 0.04 0.05     CMP:  Recent Labs   Lab 02/14/23  0011 02/14/23  0912 02/15/23  0333 02/15/23  1422    140 142 143   K 2.8* 5.5* 2.9* 3.7    108 108 106   CO2 24 22* 24 28   BUN 21 22 23 22   CREATININE 1.3 1.3 1.2 1.5*    110 107 101   CALCIUM 9.4 9.5 9.0 10.0   MG 1.7 2.4 1.9 2.3  2.3   PHOS 2.4* 4.0 2.3*  --    ALKPHOS 67 70 63  --    AST 30 46* 22  --    ALT 19 22 15  --    BILITOT 0.8 0.7 0.8  --    PROT 7.3 8.4 6.9  --    ALBUMIN 3.3* 3.3* 3.0*  --    ANIONGAP 12 10 10 9        Significant Imaging:   No new imaging this morning.      Assessment/Plan:      * Hypertensive emergency  - Hypertensive emergency with end-organ damage with systolic pressures to the 240s.  - Encephalopathy likely multifactorial with potential stroke, seizure, hypertension, sepsis, and toxins all potential contributors.  - CT Head without acute infarct; UTox positive for cocaine, marijuana.  - Hypothermic in addition initially though was found down outdoors.  - Significant continued improvement in mentation. Able to state name and family located. Discussed with sister at bedside.  - Blood cultures show 1/4 bottles with GPCs; MRSA screen negative. Continue with ceftriaxone 1g IV q24hr; holding further vancomycin.  - Weaned from nicardipine; continue nifedipine 60mg PO BID, clonidine 0.2mg transdermal weekly.  - U/S LUE without evidence of " thrombosis.  - Appreciate pulm/crit assistance.    Elevated troponin  - As above.    Leukocytosis  - As above.    Hypothermia  - As above.    Metabolic acidosis  - As above.    Polysubstance abuse  - As above.    Acute renal failure  - Unknown baseline; continuing to improve.  - Monitor.    Encephalopathy, metabolic  - As above.    Acute hypercapnic respiratory failure  - As above.    VTE Risk Mitigation (From admission, onward)         Ordered     heparin (porcine) injection 5,000 Units  Every 8 hours         02/12/23 1602     IP VTE HIGH RISK PATIENT  Once         02/12/23 1602     Place sequential compression device  Until discontinued         02/12/23 1602                Discharge Planning   VINCE:      Code Status: Full Code   Is the patient medically ready for discharge?:     Reason for patient still in hospital (select all that apply): Treatment  Discharge Plan A: Home with family                  D Larry Sinha MD  Department of Hospital Medicine   [facility] - Intensive Care ([place])

## 2023-02-16 PROBLEM — R53.81 DEBILITY: Status: ACTIVE | Noted: 2023-02-16

## 2023-02-16 LAB
ALBUMIN SERPL BCP-MCNC: 3.1 G/DL (ref 3.5–5.2)
ALP SERPL-CCNC: 62 U/L (ref 55–135)
ALT SERPL W/O P-5'-P-CCNC: 18 U/L (ref 10–44)
ANION GAP SERPL CALC-SCNC: 8 MMOL/L (ref 8–16)
AST SERPL-CCNC: 18 U/L (ref 10–40)
BASOPHILS # BLD AUTO: 0.05 K/UL (ref 0–0.2)
BASOPHILS NFR BLD: 0.6 % (ref 0–1.9)
BILIRUB SERPL-MCNC: 0.8 MG/DL (ref 0.1–1)
BUN SERPL-MCNC: 19 MG/DL (ref 8–23)
CALCIUM SERPL-MCNC: 9.6 MG/DL (ref 8.7–10.5)
CHLORIDE SERPL-SCNC: 109 MMOL/L (ref 95–110)
CO2 SERPL-SCNC: 26 MMOL/L (ref 23–29)
CREAT SERPL-MCNC: 1.2 MG/DL (ref 0.5–1.4)
DIFFERENTIAL METHOD: ABNORMAL
EOSINOPHIL # BLD AUTO: 0.1 K/UL (ref 0–0.5)
EOSINOPHIL NFR BLD: 1.2 % (ref 0–8)
ERYTHROCYTE [DISTWIDTH] IN BLOOD BY AUTOMATED COUNT: 15.2 % (ref 11.5–14.5)
EST. GFR  (NO RACE VARIABLE): >60 ML/MIN/1.73 M^2
GLUCOSE SERPL-MCNC: 109 MG/DL (ref 70–110)
HCT VFR BLD AUTO: 41.3 % (ref 40–54)
HGB BLD-MCNC: 14.3 G/DL (ref 14–18)
IMM GRANULOCYTES # BLD AUTO: 0.02 K/UL (ref 0–0.04)
IMM GRANULOCYTES NFR BLD AUTO: 0.2 % (ref 0–0.5)
LYMPHOCYTES # BLD AUTO: 1.5 K/UL (ref 1–4.8)
LYMPHOCYTES NFR BLD: 17 % (ref 18–48)
MAGNESIUM SERPL-MCNC: 2 MG/DL (ref 1.6–2.6)
MCH RBC QN AUTO: 30.4 PG (ref 27–31)
MCHC RBC AUTO-ENTMCNC: 34.6 G/DL (ref 32–36)
MCV RBC AUTO: 88 FL (ref 82–98)
MONOCYTES # BLD AUTO: 0.6 K/UL (ref 0.3–1)
MONOCYTES NFR BLD: 7 % (ref 4–15)
NEUTROPHILS # BLD AUTO: 6.6 K/UL (ref 1.8–7.7)
NEUTROPHILS NFR BLD: 74 % (ref 38–73)
NRBC BLD-RTO: 0 /100 WBC
PHOSPHATE SERPL-MCNC: 2.5 MG/DL (ref 2.7–4.5)
PLATELET # BLD AUTO: 186 K/UL (ref 150–450)
PMV BLD AUTO: 10.5 FL (ref 9.2–12.9)
POTASSIUM SERPL-SCNC: 3.6 MMOL/L (ref 3.5–5.1)
PROT SERPL-MCNC: 6.8 G/DL (ref 6–8.4)
RBC # BLD AUTO: 4.71 M/UL (ref 4.6–6.2)
SODIUM SERPL-SCNC: 143 MMOL/L (ref 136–145)
WBC # BLD AUTO: 8.92 K/UL (ref 3.9–12.7)

## 2023-02-16 PROCEDURE — 21400001 HC TELEMETRY ROOM

## 2023-02-16 PROCEDURE — 25000003 PHARM REV CODE 250: Performed by: INTERNAL MEDICINE

## 2023-02-16 PROCEDURE — C9113 INJ PANTOPRAZOLE SODIUM, VIA: HCPCS | Performed by: HOSPITALIST

## 2023-02-16 PROCEDURE — 99232 PR SUBSEQUENT HOSPITAL CARE,LEVL II: ICD-10-PCS | Mod: ,,, | Performed by: INTERNAL MEDICINE

## 2023-02-16 PROCEDURE — 94761 N-INVAS EAR/PLS OXIMETRY MLT: CPT

## 2023-02-16 PROCEDURE — 84100 ASSAY OF PHOSPHORUS: CPT | Performed by: HOSPITALIST

## 2023-02-16 PROCEDURE — 80053 COMPREHEN METABOLIC PANEL: CPT | Performed by: HOSPITALIST

## 2023-02-16 PROCEDURE — 85025 COMPLETE CBC W/AUTO DIFF WBC: CPT | Performed by: HOSPITALIST

## 2023-02-16 PROCEDURE — 97116 GAIT TRAINING THERAPY: CPT | Mod: CQ

## 2023-02-16 PROCEDURE — 83735 ASSAY OF MAGNESIUM: CPT | Performed by: HOSPITALIST

## 2023-02-16 PROCEDURE — 36415 COLL VENOUS BLD VENIPUNCTURE: CPT | Performed by: HOSPITALIST

## 2023-02-16 PROCEDURE — 99900035 HC TECH TIME PER 15 MIN (STAT)

## 2023-02-16 PROCEDURE — 63600175 PHARM REV CODE 636 W HCPCS: Performed by: INTERNAL MEDICINE

## 2023-02-16 PROCEDURE — 97110 THERAPEUTIC EXERCISES: CPT | Mod: CQ

## 2023-02-16 PROCEDURE — 63600175 PHARM REV CODE 636 W HCPCS: Performed by: HOSPITALIST

## 2023-02-16 PROCEDURE — 25000003 PHARM REV CODE 250: Performed by: HOSPITALIST

## 2023-02-16 PROCEDURE — 99232 SBSQ HOSP IP/OBS MODERATE 35: CPT | Mod: ,,, | Performed by: INTERNAL MEDICINE

## 2023-02-16 RX ORDER — HYDROXYZINE HYDROCHLORIDE 25 MG/1
50 TABLET, FILM COATED ORAL 4 TIMES DAILY PRN
Status: DISCONTINUED | OUTPATIENT
Start: 2023-02-16 | End: 2023-02-17 | Stop reason: HOSPADM

## 2023-02-16 RX ADMIN — HYDROXYZINE HYDROCHLORIDE 50 MG: 25 TABLET ORAL at 03:02

## 2023-02-16 RX ADMIN — MUPIROCIN: 20 OINTMENT TOPICAL at 09:02

## 2023-02-16 RX ADMIN — SODIUM PHOSPHATE, MONOBASIC, MONOHYDRATE AND SODIUM PHOSPHATE, DIBASIC, ANHYDROUS 30 MMOL: 276; 142 INJECTION, SOLUTION INTRAVENOUS at 09:02

## 2023-02-16 RX ADMIN — HEPARIN SODIUM 5000 UNITS: 5000 INJECTION INTRAVENOUS; SUBCUTANEOUS at 08:02

## 2023-02-16 RX ADMIN — NIFEDIPINE 60 MG: 30 TABLET, FILM COATED, EXTENDED RELEASE ORAL at 08:02

## 2023-02-16 RX ADMIN — CEFTRIAXONE 1 G: 1 INJECTION, POWDER, FOR SOLUTION INTRAMUSCULAR; INTRAVENOUS at 09:02

## 2023-02-16 RX ADMIN — LORAZEPAM 2 MG: 2 INJECTION INTRAMUSCULAR; INTRAVENOUS at 12:02

## 2023-02-16 RX ADMIN — PANTOPRAZOLE SODIUM 40 MG: 40 INJECTION, POWDER, FOR SOLUTION INTRAVENOUS at 09:02

## 2023-02-16 RX ADMIN — HEPARIN SODIUM 5000 UNITS: 5000 INJECTION INTRAVENOUS; SUBCUTANEOUS at 01:02

## 2023-02-16 RX ADMIN — AZELASTINE HYDROCHLORIDE 137 MCG: 137 SPRAY, METERED NASAL at 08:02

## 2023-02-16 RX ADMIN — CHLORHEXIDINE GLUCONATE 0.12% ORAL RINSE 15 ML: 1.2 LIQUID ORAL at 09:02

## 2023-02-16 RX ADMIN — AZELASTINE HYDROCHLORIDE 137 MCG: 137 SPRAY, METERED NASAL at 09:02

## 2023-02-16 RX ADMIN — NIFEDIPINE 60 MG: 30 TABLET, FILM COATED, EXTENDED RELEASE ORAL at 09:02

## 2023-02-16 RX ADMIN — HEPARIN SODIUM 5000 UNITS: 5000 INJECTION INTRAVENOUS; SUBCUTANEOUS at 06:02

## 2023-02-16 NOTE — ASSESSMENT & PLAN NOTE
- Hypertensive emergency with end-organ damage with systolic pressures to the 240s.  - Encephalopathy likely multifactorial with potential stroke, seizure, hypertension, sepsis, and toxins all potential contributors.  - CT Head without acute infarct; UTox positive for cocaine, marijuana.  - Hypothermic in addition initially though was found down outdoors.  - Significant continued improvement in mentation. Able to state name and family located. Discussed with sister at bedside.  - Blood cultures show 1/4 bottles with GPCs; MRSA screen negative. Continue with ceftriaxone 1g IV q24hr; holding further vancomycin.  - Weaned from nicardipine; continue nifedipine 60mg PO BID, clonidine 0.2mg transdermal weekly.  - U/S LUE without evidence of thrombosis.  - Appreciate pulm/crit assistance.

## 2023-02-16 NOTE — NURSING TRANSFER
Nursing Transfer Note      2/16/2023     Reason patient is being transferred: Low acuity    Transfer From: ICU    Transfer via bed    Transfer with cardiac monitoring    Transported by Nurse    Medicines sent: No    Any special needs or follow-up needed: No    Chart send with patient: Yes    Notified: Sister    Patient reassessed at: 02/16/2023, 0230 (date, time)    Upon arrival to floor: cardiac monitor applied, patient oriented to room, call bell in reach, and bed in lowest position

## 2023-02-16 NOTE — NURSING
Nurses Note -- 4 Eyes      2/16/2023   3:58 AM      Skin assessed during: Transfer      [x] No Pressure Injuries Present    []Prevention Measures Documented      [] Yes- Altered Skin Integrity Present or Discovered   [] LDA Added if Not in Epic (Describe Wound)   [] New Altered Skin Integrity was Present on Admit and Documented in LDA   [] Wound Image Taken    Wound Care Consulted? No    Attending Nurse:  Elizabeth Mendoza RN     Second RN/Staff Member:  Aleshia CHO

## 2023-02-16 NOTE — PLAN OF CARE
Transferred to [address] [place] [address] from ICU.  Sleeping.  Tele sitter activated, bed alarm on.  Continue POC.    Problem: Infection  Goal: Absence of Infection Signs and Symptoms  Outcome: Ongoing, Progressing     Problem: Fluid and Electrolyte Imbalance (Acute Kidney Injury/Impairment)  Goal: Fluid and Electrolyte Balance  Outcome: Ongoing, Progressing     Problem: Oral Intake Inadequate (Acute Kidney Injury/Impairment)  Goal: Optimal Nutrition Intake  Outcome: Ongoing, Progressing     Problem: Renal Function Impairment (Acute Kidney Injury/Impairment)  Goal: Effective Renal Function  Outcome: Ongoing, Progressing     Problem: Skin Injury Risk Increased  Goal: Skin Health and Integrity  Outcome: Ongoing, Progressing     Problem: Fall Injury Risk  Goal: Absence of Fall and Fall-Related Injury  Outcome: Ongoing, Progressing

## 2023-02-16 NOTE — PLAN OF CARE
CM met with patient to discuss therapy recs of SNF. Patient confused stating his mom will take care of him at home. When asked how old was his mom he responded 34 years old.    JULIANNE then spoke with sister Carolina by phone who stated they would before prefer home w/Home Health as opposed to SNF. Will update MD.

## 2023-02-16 NOTE — PLAN OF CARE
Problem: Infection  Goal: Absence of Infection Signs and Symptoms  Outcome: Ongoing, Progressing     Problem: Communication Impairment (Mechanical Ventilation, Invasive)  Goal: Effective Communication  Outcome: Ongoing, Progressing     Problem: Adult Inpatient Plan of Care  Goal: Plan of Care Review  Outcome: Ongoing, Progressing  Goal: Optimal Comfort and Wellbeing  Outcome: Ongoing, Progressing     Problem: Fluid and Electrolyte Imbalance (Acute Kidney Injury/Impairment)  Goal: Fluid and Electrolyte Balance  Outcome: Ongoing, Progressing     Problem: Oral Intake Inadequate (Acute Kidney Injury/Impairment)  Goal: Optimal Nutrition Intake  Outcome: Ongoing, Progressing     Problem: Fall Injury Risk  Goal: Absence of Fall and Fall-Related Injury  Outcome: Ongoing, Progressing

## 2023-02-16 NOTE — NURSING
Patient was transferred to the Med surg tele at around 2:30 am on bed 354, blister was seen on the left upper arm, covered with the mepelex, med surg nurse notified about the findings.

## 2023-02-16 NOTE — SUBJECTIVE & OBJECTIVE
"Interval History: No acute events overnight. Discussed with patient and later sister at bedside; feeling much improved.    Review of Systems   Constitutional:  Negative for chills and fever.   Respiratory:  Negative for cough and shortness of breath.    Cardiovascular:  Negative for chest pain and palpitations.   Gastrointestinal:  Negative for abdominal pain and nausea.   Objective:     Vital Signs (Most Recent):  Temp: 98.8 °F (37.1 °C) (02/15/23 1600)  Pulse: 82 (02/15/23 2004)  Resp: (!) 30 (02/15/23 2004)  BP: (!) 144/80 (02/15/23 2004)  SpO2: 95 % (02/15/23 2004)   Vital Signs (24h Range):  Temp:  [97.5 °F (36.4 °C)-98.8 °F (37.1 °C)] 98.8 °F (37.1 °C)  Pulse:  [] 82  Resp:  [8-37] 30  SpO2:  [91 %-99 %] 95 %  BP: (131-156)/(70-97) 144/80     Weight: 71.2 kg (156 lb 15.5 oz)  Body mass index is 22.52 kg/m².    Intake/Output Summary (Last 24 hours) at 2/15/2023 2027  Last data filed at 2/15/2023 0705  Gross per 24 hour   Intake 49.1 ml   Output 760 ml   Net -710.9 ml        Physical Exam  Vitals and nursing note reviewed.   Constitutional:       General: He is not in acute distress.     Appearance: He is well-developed.   HENT:      Head: Normocephalic and atraumatic.   Eyes:      General:         Right eye: No discharge.         Left eye: No discharge.      Conjunctiva/sclera: Conjunctivae normal.   Cardiovascular:      Rate and Rhythm: Normal rate.      Pulses: Normal pulses.      Heart sounds: Murmur heard.   Pulmonary:      Effort: Pulmonary effort is normal. No respiratory distress.   Abdominal:      Palpations: Abdomen is soft.      Tenderness: There is no abdominal tenderness.   Musculoskeletal:         General: Normal range of motion.      Right lower leg: No edema.      Left lower leg: No edema.      Comments: LUE edema   Skin:     General: Skin is warm and dry.   Neurological:      Mental Status: He is alert.      Comments: Oriented x 1 (name, "[facility]," "2019")     Significant Labs: "   CBC:  Recent Labs   Lab 02/13/23  0336 02/14/23  0837 02/15/23  0333   WBC 13.24* 11.71 10.93   HGB 13.8* 15.2 14.4   HCT 38.7* 44.7 41.4    191 152   GRAN 84.9*  11.3* 88.6*  10.4* 82.9*  9.1*   LYMPH 6.4*  0.9* 5.8*  0.7* 10.0*  1.1   MONO 7.6  1.0 4.9  0.6 5.2  0.6   EOS 0.0 0.0 0.1   BASO 0.05 0.04 0.05     CMP:  Recent Labs   Lab 02/14/23  0011 02/14/23  0912 02/15/23  0333 02/15/23  1422    140 142 143   K 2.8* 5.5* 2.9* 3.7    108 108 106   CO2 24 22* 24 28   BUN 21 22 23 22   CREATININE 1.3 1.3 1.2 1.5*    110 107 101   CALCIUM 9.4 9.5 9.0 10.0   MG 1.7 2.4 1.9 2.3  2.3   PHOS 2.4* 4.0 2.3*  --    ALKPHOS 67 70 63  --    AST 30 46* 22  --    ALT 19 22 15  --    BILITOT 0.8 0.7 0.8  --    PROT 7.3 8.4 6.9  --    ALBUMIN 3.3* 3.3* 3.0*  --    ANIONGAP 12 10 10 9        Significant Imaging:   No new imaging this morning.

## 2023-02-16 NOTE — PROGRESS NOTES
[facility] - Intensive Care ([place])  Adult Nutrition  Consult Note    SUMMARY     Recommendations  1) Continue regular diet as tolerated  2) Add Boost Plus to optimize nutrition  3) Encourage intake at meals  4) Monitor weights/I&Os  5) RD to follow up  Goals:   Patient to continue to meet % intake of meals by RD follow up   Monitored labs to trend toward target ranges    Nutrition Goal Status: new  Communication of RD Recs:  (POC)    Assessment and Plan  Nutrition Problem  Impaired ability to prepare foods/meals    Related to (etiology):   AMS    Signs and Symptoms (as evidenced by):   Patient nonverbal, confused, decreased ability to perform independent ADLs    Interventions:  Collaboration with other providers    Nutrition Diagnosis Status:   New      Malnutrition Assessment                     Anglic[place] Region (Muscle Loss): mild depletion  Clavicle Bone Region (Muscle Loss): mild depletion                 Reason for Assessment  Reason For Assessment: consult, new tube feeding (Nutrition recommendations)  Diagnosis:  (Hypertensive emergency)  Relevant Medical History:   Patient Active Problem List   Diagnosis    Hypertensive emergency    Acute hypercapnic respiratory failure    Encephalopathy, metabolic    Acute renal failure    Polysubstance abuse    Metabolic acidosis    Hypothermia    Leukocytosis    Elevated troponin    Elevated brain natriuretic peptide (BNP) level     Interdisciplinary Rounds: did not attend  General Information Comments:   2/16: Patient extubated 2/13, moved to [facility] today. Receiving regular diet, tolerating, % intake reported. Sitter in room, states patient has good appetite, no difficulties chewing or swallowing. No other complaints at the time, patient unwilling to participate d/t AMS. Mickey 19- skin intact. NFPE completed 2/16 patient presents with mild muscle loss.     2/13: patient found unresponsive by EMS, currently in ICU intubated/sedated. Patient is NPO, with  "possible extubation today. Mickey 13- PIV x 4, NG/OGT in place. FAWN NFPE patient intubated, will reassess at next follow up.    Nutrition Discharge Planning: dc general healthful diet    Nutrition Risk Screen  Nutrition Risk Screen: no indicators present    Nutrition/Diet History  Spiritual, Cultural Beliefs, [employer]ism Practices, Values that Affect Care: no  Factors Affecting Nutritional Intake: impaired cognitive status/motor control    Anthropometrics  Temp: 98.4 °F (36.9 °C)  Height Method: Estimated  Height: 5' 10" (177.8 cm)  Height (inches): 70 in  Weight Method: Bed Scale  Weight: 71.2 kg (156 lb 15.5 oz)  Weight (lb): 156.97 lb  BMI (Calculated): 22.5  BMI Grade: 18.5-24.9 - normal       Lab/Procedures/Meds  Pertinent Labs Reviewed: reviewed  CBC:  Recent Labs   Lab 02/16/23  0449   WBC 8.92   HGB 14.3   HCT 41.3        CMP:  Recent Labs   Lab 02/16/23  0449   CALCIUM 9.6   ALBUMIN 3.1*   PROT 6.8      K 3.6   CO2 26      BUN 19   CREATININE 1.2   ALKPHOS 62   ALT 18   AST 18   BILITOT 0.8     Pertinent Medications Reviewed: reviewed  Scheduled Meds:   azelastine  1 spray Nasal BID    cefTRIAXone (ROCEPHIN) IVPB  1 g Intravenous Q24H    chlorhexidine  15 mL Mouth/Throat BID    cloNIDine 0.2 mg/24 hr td ptwk  1 patch Transdermal Q7 Days    heparin (porcine)  5,000 Units Subcutaneous Q8H    mupirocin   Nasal BID    NIFEdipine  60 mg Oral BID    pantoprazole  40 mg Intravenous Daily     Continuous Infusions:  PRN Meds:.acetaminophen, lorazepam, ondansetron, ondansetron    Estimated/Assessed Needs  Weight Used For Calorie Calculations: 71.2 kg (156 lb 15.5 oz)  Energy Calorie Requirements (kcal): 1921  Energy Need Method: [place] (x 1.2)  Protein Requirements: 71-86 (1.0-1.2 g/kg)  Weight Used For Protein Calculations: 71.2 kg (156 lb 15.5 oz)  Fluid Requirements (mL): 1 mL/kcal  Estimated Fluid Requirement Method:  (or per MD)  RDA Method (mL): 1921  CHO Requirement: 240g    Nutrition " Prescription Ordered  Current Diet Order: Regular    Evaluation of Received Nutrient/Fluid Intake  Lipid Calories (kcals):  (off)  I/O: +8.9 mL since admit  Energy Calories Required: meeting needs  Protein Required: meeting needs  Fluid Required: meeting needs  Comments: LBM 2/15  % Intake of Estimated Energy Needs: 75 - 100 %  % Meal Intake: 75 - 100 %  Intake/Output - Last 3 Shifts         02/14 0700  02/15 0659 02/15 0700 02/16 0659 02/16 0700 02/17 0659    P.O.  100     I.V. (mL/kg) 0.9 (0)      IV Piggyback 146.4      Total Intake(mL/kg) 147.3 (2.1) 100 (1.4)     Urine (mL/kg/hr) 1285 (0.8) 100 (0.1)     Stool  0     Total Output 1285 100     Net -1137.8 0            Urine Occurrence  5 x     Stool Occurrence  1 x           Nutrition Risk  Level of Risk/Frequency of Follow-up:  (1-2 x/week)     Monitor and Evaluation  Food and Nutrient Intake: enteral nutrition intake  Food and Nutrient Adminstration: enteral and parenteral nutrition administration  Physical Activity and Function: nutrition-related ADLs and IADLs  Anthropometric Measurements: weight, weight change, body mass index  Biochemical Data, Medical Tests and Procedures: electrolyte and renal panel, gastrointestinal profile, glucose/endocrine profile, inflammatory profile, lipid profile  Nutrition-Focused Physical Findings: overall appearance     Nutrition Follow-Up  RD Follow-up?: Yes  Stacey Epstein, Registration Eligible, Provisional LDN

## 2023-02-16 NOTE — PLAN OF CARE
Recommendations  1) Continue regular diet as tolerated  2) Add Boost Plus to optimize nutrition  3) Encourage intake at meals  4) Monitor weights/I&Os  5) RD to follow up  Goals:   Patient to continue to meet % intake of meals by RD follow up   Monitored labs to trend toward target ranges    Nutrition Goal Status: new  Communication of RD Recs:  (POC)

## 2023-02-16 NOTE — ASSESSMENT & PLAN NOTE
- Hypertensive emergency with end-organ damage with systolic pressures to the 240s.  - Encephalopathy likely multifactorial with potential stroke, seizure, hypertension, sepsis, and toxins all potential contributors.  - CT Head without acute infarct; UTox positive for cocaine, marijuana.  - Hypothermic in addition initially though was found down outdoors.  - Significant continued improvement in mentation. Able to state name and family located. Discussed with sister; reports he has some dementia at baseline.  - Blood cultures show 1/4 bottles with GPCs; coag-negative staph, likely contaminant. Hold further antibiotic therapy.  - Weaned from nicardipine; continue nifedipine 60mg PO BID, clonidine 0.2mg transdermal weekly.  - U/S LUE without evidence of thrombosis.

## 2023-02-16 NOTE — PROGRESS NOTES
"[facility] Surg ([address] Medicine  Progress Note    Patient Name: Alejandro Farrar  MRN: [id number]  Patient Class: IP- Inpatient   Admission Date: 2/12/2023  Length of Stay: 4 days  Attending Physician: NEL Sinha MD  Primary Care Provider: Primary Doctor No        Subjective:     Principal Problem:Hypertensive emergency        HPI:  Unknown male was found down in the street.  It was reported that a bystander said his name was "Alejandro" but that person is no longer available to be interviewed.  The fire department reported pinpoint pupils with agonal respiration and he was given Narcan without improvement in his mentation.  On arrival to the hospital, he was noted to be drooling and contracted, and he started having seizure-like activity witnessed by staff.  Physical exam by the ER physician document eye deviation to the left and downward.  He was unable to protect his airway and was intubated.  Noted to be hypothermic with a temperature of 91.6° F, heart rate 131, duration 34, blood pressure 242/144.  Treatment initiated with Cardene drip for blood pressure control and patient was given empiric cefepime and vancomycin after obtaining cultures.  Workup in the ER, remarkable for head CT showed no acute abnormalities but showed "Foci of low attenuation involving the bilateral corona radiata and basal ganglia are suspected to reflect that of remote infarct." U tox remarkable for cocaine and marijuana, BUN and creatinine of 33/2.3, , , troponin 0.049, lactate 5.1 procalcitonin 1.09, serum alcohol negative, WBC 23.42, UA overall not remarkable, chest x-ray without definitive infiltrate, ABG pH 7.184 pCO2 71 PO2 330 bicarb 29.        Overview/Hospital Course:  Unknown male admitted with hypertensive emergency with encephalopathy, acute respiratory failure and acute renal failure with unknown baseline.  Intubated for airway protection, U tox positive for cocaine and marijuana, initial head " CT with no acute finding but possible remote infarct and microvascular ischemic changes.  Initial concern for seizure-like activity reported in the ER and he was noted to be hypothermic.  Empirically started on cefepime and vancomycin, Cardene drip pressure control.      Interval History: No acute events overnight. Feeling well this morning. Discussed therapy recommendations for SNF. No other concerns at this time.    Review of Systems   Constitutional:  Negative for chills and fever.   Respiratory:  Negative for cough and shortness of breath.    Cardiovascular:  Negative for chest pain and palpitations.   Gastrointestinal:  Negative for abdominal pain, nausea and vomiting.   Objective:     Vital Signs (Most Recent):  Temp: 97.7 °F (36.5 °C) (02/16/23 1522)  Pulse: 109 (02/16/23 1600)  Resp: 18 (02/16/23 1522)  BP: (!) 124/94 (02/16/23 1522)  SpO2: 95 % (02/16/23 1522)   Vital Signs (24h Range):  Temp:  [97.7 °F (36.5 °C)-98.6 °F (37 °C)] 97.7 °F (36.5 °C)  Pulse:  [] 109  Resp:  [18-34] 18  SpO2:  [82 %-96 %] 95 %  BP: (116-165)/(70-94) 124/94     Weight: 71.2 kg (156 lb 15.5 oz)  Body mass index is 22.52 kg/m².    Intake/Output Summary (Last 24 hours) at 2/16/2023 1638  Last data filed at 2/16/2023 0600  Gross per 24 hour   Intake 100 ml   Output --   Net 100 ml      Physical Exam  Vitals and nursing note reviewed.   Constitutional:       General: He is not in acute distress.     Appearance: He is well-developed.   HENT:      Head: Normocephalic and atraumatic.   Eyes:      General:         Right eye: No discharge.         Left eye: No discharge.      Conjunctiva/sclera: Conjunctivae normal.   Cardiovascular:      Rate and Rhythm: Normal rate.      Pulses: Normal pulses.      Heart sounds: Murmur heard.   Pulmonary:      Effort: Pulmonary effort is normal. No respiratory distress.   Abdominal:      Palpations: Abdomen is soft.      Tenderness: There is no abdominal tenderness.   Musculoskeletal:          "General: Normal range of motion.      Right lower leg: No edema.      Left lower leg: No edema.      Comments: LUE edema   Skin:     General: Skin is warm and dry.   Neurological:      Mental Status: He is alert.      Comments: Oriented x 2 (name, "hospital," "don't know")     Significant Labs:   CBC:  Recent Labs   Lab 02/14/23  0837 02/15/23  0333 02/16/23  0449   WBC 11.71 10.93 8.92   HGB 15.2 14.4 14.3   HCT 44.7 41.4 41.3    152 186   GRAN 88.6*  10.4* 82.9*  9.1* 74.0*  6.6   LYMPH 5.8*  0.7* 10.0*  1.1 17.0*  1.5   MONO 4.9  0.6 5.2  0.6 7.0  0.6   EOS 0.0 0.1 0.1   BASO 0.04 0.05 0.05   CMP:  Recent Labs   Lab 02/14/23  0912 02/15/23  0333 02/15/23  1422 02/16/23  0449    142 143 143   K 5.5* 2.9* 3.7 3.6    108 106 109   CO2 22* 24 28 26   BUN 22 23 22 19   CREATININE 1.3 1.2 1.5* 1.2    107 101 109   CALCIUM 9.5 9.0 10.0 9.6   MG 2.4 1.9 2.3  2.3 2.0   PHOS 4.0 2.3*  --  2.5*   ALKPHOS 70 63  --  62   AST 46* 22  --  18   ALT 22 15  --  18   BILITOT 0.7 0.8  --  0.8   PROT 8.4 6.9  --  6.8   ALBUMIN 3.3* 3.0*  --  3.1*   ANIONGAP 10 10 9 8      Significant Imaging:   No new imaging this morning.      Assessment/Plan:      Neuro  Encephalopathy, metabolic  - As above.    Pulmonary  Acute hypercapnic respiratory failure  - As above.    Cardiac/Vascular  * Hypertensive emergency  - Hypertensive emergency with end-organ damage with systolic pressures to the 240s.  - Encephalopathy likely multifactorial with potential stroke, seizure, hypertension, sepsis, and toxins all potential contributors.  - CT Head without acute infarct; UTox positive for cocaine, marijuana.  - Hypothermic in addition initially though was found down outdoors.  - Significant continued improvement in mentation. Able to state name and family located. Discussed with sister; reports he has some dementia at baseline.  - Blood cultures show 1/4 bottles with GPCs; coag-negative staph, likely contaminant. Hold " further antibiotic therapy.  - Weaned from nicardipine; continue nifedipine 60mg PO BID, clonidine 0.2mg transdermal weekly.  - U/S LUE without evidence of thrombosis.    Elevated troponin  - As above.    Renal/  Metabolic acidosis  - As above.    Acute renal failure  - Unknown baseline; continuing to improve.  - Monitor.    Oncology  Leukocytosis  - As above.    Other  Debility  - PT/OT.    Hypothermia  - As above.    Polysubstance abuse  - As above.    VTE Risk Mitigation (From admission, onward)         Ordered     heparin (porcine) injection 5,000 Units  Every 8 hours         02/12/23 1602     IP VTE HIGH RISK PATIENT  Once         02/12/23 1602     Place sequential compression device  Until discontinued         02/12/23 1602                Discharge Planning   VINCE:      Code Status: Full Code   Is the patient medically ready for discharge?:     Reason for patient still in hospital (select all that apply): Treatment  Discharge Plan A: Home with family                  D Larry Sinha MD  Department of Hospital Medicine   [facility] ([address])

## 2023-02-16 NOTE — PT/OT/SLP PROGRESS
Physical Therapy Treatment    Patient Name:  Alejandro Farrar   MRN:  [id number]    Recommendations:     Discharge Recommendations: nursing facility, skilled  Discharge Equipment Recommendations: bedside commode, walker, rolling (needed at this time)  Barriers to discharge: Decreased caregiver support    Assessment:     Alejandro Farrar is a 72 y.o. male admitted with a medical diagnosis of Hypertensive emergency.  He presents with the following impairments/functional limitations: weakness, impaired endurance, impaired self care skills, impaired functional mobility, gait instability, impaired balance, impaired cognition, decreased coordination, decreased lower extremity function, decreased safety awareness , pt with improved mobility today, inc distance slightly into hallway, though req's Izzy for balance and RW mgmt..    Rehab Prognosis: Good; patient would benefit from acute skilled PT services to address these deficits and reach maximum level of function.    Recent Surgery: * No surgery found *      Plan:     During this hospitalization, patient to be seen 5 x/week to address the identified rehab impairments via gait training, therapeutic activities, therapeutic exercises, neuromuscular re-education and progress toward the following goals:    Plan of Care Expires:  03/16/23    Subjective     Chief Complaint: some stiffness in LE's  Patient/Family Comments/goals: pt agreeable to session, reports he's at [facility].   Pain/Comfort:  Pain Rating 1: 0/10  Pain Rating Post-Intervention 1: 0/10      Objective:     Communicated with nurse prior to session.  Patient found HOB elevated with peripheral IV, telemetry ([employer]vas[employer] monitoring, and sitter present) upon PT entry to room.     General Precautions: Standard, fall (confusion)  Orthopedic Precautions: N/A  Braces: N/A  Respiratory Status: Room air     Functional Mobility:  Bed Mobility:     Supine to Sit: minimum assistance  Sit to Supine: stand by assistance  Transfers:    "  Sit to Stand:  contact guard assistance and minimum assistance with rolling walker  Gait: amb'd 60' w/ RW and CGA/Izzy, and needing Izzy for RW mgmt.      AM-PAC 6 CLICK MOBILITY  Turning over in bed (including adjusting bedclothes, sheets and blankets)?: 3  Sitting down on and standing up from a chair with arms (e.g., wheelchair, bedside commode, etc.): 3  Moving from lying on back to sitting on the side of the bed?: 3  Moving to and from a bed to a chair (including a wheelchair)?: 3  Need to walk in hospital room?: 3  Climbing 3-5 steps with a railing?: 2  Basic Mobility Total Score: 17       Treatment & Education:  Pt perf'd seated LE ex's of AP's, hip flex, LAQ"s x 5 ea.     Patient left HOB elevated with all lines intact, call button in reach, nurse notified, and sitter present..    GOALS:   Multidisciplinary Problems       Physical Therapy Goals          Problem: Physical Therapy    Goal Priority Disciplines Outcome Goal Variances Interventions   Physical Therapy Goal     PT, PT/OT Ongoing, Progressing     Description: Goals to be met by: 3/16/2023    Patient will increase functional independence with mobility by performin. Sit<>stand with supervision with rolling walker.  2. Gait x 50 feet with contact guard assistance with rolling walker.  3. Standing balance x2mins with contact guard assistance.                           Time Tracking:     PT Received On: 23  PT Start Time: 1100     PT Stop Time: 1123  PT Total Time (min): 23 min     Billable Minutes: Gait Training 13 and Therapeutic Exercise 10    Treatment Type: Treatment  PT/PTA: PTA     PTA Visit Number: 1     2023  "

## 2023-02-16 NOTE — SUBJECTIVE & OBJECTIVE
"Interval History: No acute events overnight. Feeling well this morning. Discussed therapy recommendations for SNF. No other concerns at this time.    Review of Systems   Constitutional:  Negative for chills and fever.   Respiratory:  Negative for cough and shortness of breath.    Cardiovascular:  Negative for chest pain and palpitations.   Gastrointestinal:  Negative for abdominal pain, nausea and vomiting.   Objective:     Vital Signs (Most Recent):  Temp: 97.7 °F (36.5 °C) (02/16/23 1522)  Pulse: 109 (02/16/23 1600)  Resp: 18 (02/16/23 1522)  BP: (!) 124/94 (02/16/23 1522)  SpO2: 95 % (02/16/23 1522)   Vital Signs (24h Range):  Temp:  [97.7 °F (36.5 °C)-98.6 °F (37 °C)] 97.7 °F (36.5 °C)  Pulse:  [] 109  Resp:  [18-34] 18  SpO2:  [82 %-96 %] 95 %  BP: (116-165)/(70-94) 124/94     Weight: 71.2 kg (156 lb 15.5 oz)  Body mass index is 22.52 kg/m².    Intake/Output Summary (Last 24 hours) at 2/16/2023 1638  Last data filed at 2/16/2023 0600  Gross per 24 hour   Intake 100 ml   Output --   Net 100 ml      Physical Exam  Vitals and nursing note reviewed.   Constitutional:       General: He is not in acute distress.     Appearance: He is well-developed.   HENT:      Head: Normocephalic and atraumatic.   Eyes:      General:         Right eye: No discharge.         Left eye: No discharge.      Conjunctiva/sclera: Conjunctivae normal.   Cardiovascular:      Rate and Rhythm: Normal rate.      Pulses: Normal pulses.      Heart sounds: Murmur heard.   Pulmonary:      Effort: Pulmonary effort is normal. No respiratory distress.   Abdominal:      Palpations: Abdomen is soft.      Tenderness: There is no abdominal tenderness.   Musculoskeletal:         General: Normal range of motion.      Right lower leg: No edema.      Left lower leg: No edema.      Comments: LUE edema   Skin:     General: Skin is warm and dry.   Neurological:      Mental Status: He is alert.      Comments: Oriented x 2 (name, "[facility]," "don't know") "     Significant Labs:   CBC:  Recent Labs   Lab 02/14/23  0837 02/15/23  0333 02/16/23  0449   WBC 11.71 10.93 8.92   HGB 15.2 14.4 14.3   HCT 44.7 41.4 41.3    152 186   GRAN 88.6*  10.4* 82.9*  9.1* 74.0*  6.6   LYMPH 5.8*  0.7* 10.0*  1.1 17.0*  1.5   MONO 4.9  0.6 5.2  0.6 7.0  0.6   EOS 0.0 0.1 0.1   BASO 0.04 0.05 0.05   CMP:  Recent Labs   Lab 02/14/23  0912 02/15/23  0333 02/15/23  1422 02/16/23  0449    142 143 143   K 5.5* 2.9* 3.7 3.6    108 106 109   CO2 22* 24 28 26   BUN 22 23 22 19   CREATININE 1.3 1.2 1.5* 1.2    107 101 109   CALCIUM 9.5 9.0 10.0 9.6   MG 2.4 1.9 2.3  2.3 2.0   PHOS 4.0 2.3*  --  2.5*   ALKPHOS 70 63  --  62   AST 46* 22  --  18   ALT 22 15  --  18   BILITOT 0.7 0.8  --  0.8   PROT 8.4 6.9  --  6.8   ALBUMIN 3.3* 3.0*  --  3.1*   ANIONGAP 10 10 9 8      Significant Imaging:   No new imaging this morning.

## 2023-02-17 ENCOUNTER — TELEPHONE (OUTPATIENT)
Dept: NEUROLOGY | Facility: CLINIC | Age: 73
End: 2023-02-17
Payer: MEDICARE

## 2023-02-17 ENCOUNTER — PATIENT OUTREACH (OUTPATIENT)
Dept: ADMINISTRATIVE | Facility: HOSPITAL | Age: 73
End: 2023-02-17
Payer: MEDICARE

## 2023-02-17 VITALS
DIASTOLIC BLOOD PRESSURE: 74 MMHG | HEIGHT: 70 IN | WEIGHT: 156.94 LBS | HEART RATE: 98 BPM | SYSTOLIC BLOOD PRESSURE: 140 MMHG | BODY MASS INDEX: 22.47 KG/M2 | TEMPERATURE: 98 F | RESPIRATION RATE: 18 BRPM | OXYGEN SATURATION: 95 %

## 2023-02-17 LAB
ALBUMIN SERPL BCP-MCNC: 3 G/DL (ref 3.5–5.2)
ALP SERPL-CCNC: 66 U/L (ref 55–135)
ALT SERPL W/O P-5'-P-CCNC: 17 U/L (ref 10–44)
ANION GAP SERPL CALC-SCNC: 6 MMOL/L (ref 8–16)
AST SERPL-CCNC: 19 U/L (ref 10–40)
BASOPHILS # BLD AUTO: 0.04 K/UL (ref 0–0.2)
BASOPHILS NFR BLD: 0.5 % (ref 0–1.9)
BILIRUB SERPL-MCNC: 0.8 MG/DL (ref 0.1–1)
BUN SERPL-MCNC: 18 MG/DL (ref 8–23)
CALCIUM SERPL-MCNC: 9.4 MG/DL (ref 8.7–10.5)
CHLORIDE SERPL-SCNC: 109 MMOL/L (ref 95–110)
CO2 SERPL-SCNC: 26 MMOL/L (ref 23–29)
CREAT SERPL-MCNC: 1.1 MG/DL (ref 0.5–1.4)
DIFFERENTIAL METHOD: ABNORMAL
EOSINOPHIL # BLD AUTO: 0.1 K/UL (ref 0–0.5)
EOSINOPHIL NFR BLD: 0.7 % (ref 0–8)
ERYTHROCYTE [DISTWIDTH] IN BLOOD BY AUTOMATED COUNT: 14.8 % (ref 11.5–14.5)
EST. GFR  (NO RACE VARIABLE): >60 ML/MIN/1.73 M^2
GLUCOSE SERPL-MCNC: 104 MG/DL (ref 70–110)
HCT VFR BLD AUTO: 42 % (ref 40–54)
HGB BLD-MCNC: 14.3 G/DL (ref 14–18)
IMM GRANULOCYTES # BLD AUTO: 0.04 K/UL (ref 0–0.04)
IMM GRANULOCYTES NFR BLD AUTO: 0.5 % (ref 0–0.5)
LYMPHOCYTES # BLD AUTO: 0.8 K/UL (ref 1–4.8)
LYMPHOCYTES NFR BLD: 9.3 % (ref 18–48)
MAGNESIUM SERPL-MCNC: 1.8 MG/DL (ref 1.6–2.6)
MCH RBC QN AUTO: 29.7 PG (ref 27–31)
MCHC RBC AUTO-ENTMCNC: 34 G/DL (ref 32–36)
MCV RBC AUTO: 87 FL (ref 82–98)
MONOCYTES # BLD AUTO: 0.6 K/UL (ref 0.3–1)
MONOCYTES NFR BLD: 6.9 % (ref 4–15)
NEUTROPHILS # BLD AUTO: 6.9 K/UL (ref 1.8–7.7)
NEUTROPHILS NFR BLD: 82.1 % (ref 38–73)
NRBC BLD-RTO: 0 /100 WBC
PHOSPHATE SERPL-MCNC: 3 MG/DL (ref 2.7–4.5)
PLATELET # BLD AUTO: 188 K/UL (ref 150–450)
PMV BLD AUTO: 10.8 FL (ref 9.2–12.9)
POCT GLUCOSE: 97 MG/DL (ref 70–110)
POTASSIUM SERPL-SCNC: 3.6 MMOL/L (ref 3.5–5.1)
PROT SERPL-MCNC: 6.9 G/DL (ref 6–8.4)
RBC # BLD AUTO: 4.82 M/UL (ref 4.6–6.2)
SODIUM SERPL-SCNC: 141 MMOL/L (ref 136–145)
WBC # BLD AUTO: 8.43 K/UL (ref 3.9–12.7)

## 2023-02-17 PROCEDURE — 25000003 PHARM REV CODE 250: Performed by: INTERNAL MEDICINE

## 2023-02-17 PROCEDURE — 97530 THERAPEUTIC ACTIVITIES: CPT | Mod: CQ

## 2023-02-17 PROCEDURE — 1111F PR DISCHARGE MEDS RECONCILED W/ CURRENT OUTPATIENT MED LIST: ICD-10-PCS | Mod: CPTII,,, | Performed by: INTERNAL MEDICINE

## 2023-02-17 PROCEDURE — 1111F DSCHRG MED/CURRENT MED MERGE: CPT | Mod: CPTII,,, | Performed by: INTERNAL MEDICINE

## 2023-02-17 PROCEDURE — C9113 INJ PANTOPRAZOLE SODIUM, VIA: HCPCS | Performed by: HOSPITALIST

## 2023-02-17 PROCEDURE — 97116 GAIT TRAINING THERAPY: CPT | Mod: CQ

## 2023-02-17 PROCEDURE — 83735 ASSAY OF MAGNESIUM: CPT | Performed by: HOSPITALIST

## 2023-02-17 PROCEDURE — 84100 ASSAY OF PHOSPHORUS: CPT | Performed by: HOSPITALIST

## 2023-02-17 PROCEDURE — 63600175 PHARM REV CODE 636 W HCPCS: Performed by: HOSPITALIST

## 2023-02-17 PROCEDURE — 80053 COMPREHEN METABOLIC PANEL: CPT | Performed by: HOSPITALIST

## 2023-02-17 PROCEDURE — 36415 COLL VENOUS BLD VENIPUNCTURE: CPT | Performed by: HOSPITALIST

## 2023-02-17 PROCEDURE — 25000003 PHARM REV CODE 250: Performed by: HOSPITALIST

## 2023-02-17 PROCEDURE — 99239 PR HOSPITAL DISCHARGE DAY,>30 MIN: ICD-10-PCS | Mod: ,,, | Performed by: INTERNAL MEDICINE

## 2023-02-17 PROCEDURE — 97535 SELF CARE MNGMENT TRAINING: CPT

## 2023-02-17 PROCEDURE — 85025 COMPLETE CBC W/AUTO DIFF WBC: CPT | Performed by: HOSPITALIST

## 2023-02-17 PROCEDURE — 99239 HOSP IP/OBS DSCHRG MGMT >30: CPT | Mod: ,,, | Performed by: INTERNAL MEDICINE

## 2023-02-17 RX ORDER — CLONIDINE 0.2 MG/24H
1 PATCH, EXTENDED RELEASE TRANSDERMAL
Qty: 4 PATCH | Refills: 0 | Status: SHIPPED | OUTPATIENT
Start: 2023-02-20 | End: 2024-02-20

## 2023-02-17 RX ORDER — ONDANSETRON 4 MG/1
4 TABLET, ORALLY DISINTEGRATING ORAL EVERY 6 HOURS PRN
Qty: 30 TABLET | Refills: 0 | Status: SHIPPED | OUTPATIENT
Start: 2023-02-17

## 2023-02-17 RX ORDER — NIFEDIPINE 60 MG/1
60 TABLET, EXTENDED RELEASE ORAL 2 TIMES DAILY
Qty: 60 TABLET | Refills: 0 | Status: SHIPPED | OUTPATIENT
Start: 2023-02-17

## 2023-02-17 RX ADMIN — NIFEDIPINE 60 MG: 30 TABLET, FILM COATED, EXTENDED RELEASE ORAL at 10:02

## 2023-02-17 RX ADMIN — CHLORHEXIDINE GLUCONATE 0.12% ORAL RINSE 15 ML: 1.2 LIQUID ORAL at 10:02

## 2023-02-17 RX ADMIN — AZELASTINE HYDROCHLORIDE 137 MCG: 137 SPRAY, METERED NASAL at 10:02

## 2023-02-17 RX ADMIN — MUPIROCIN: 20 OINTMENT TOPICAL at 10:02

## 2023-02-17 RX ADMIN — PANTOPRAZOLE SODIUM 40 MG: 40 INJECTION, POWDER, FOR SOLUTION INTRAVENOUS at 10:02

## 2023-02-17 RX ADMIN — HEPARIN SODIUM 5000 UNITS: 5000 INJECTION INTRAVENOUS; SUBCUTANEOUS at 05:02

## 2023-02-17 NOTE — PT/OT/SLP PROGRESS
Occupational Therapy   Treatment    Name: Alejandro Farrar  MRN: [id number]  Admitting Diagnosis:  Hypertensive emergency       Recommendations:     Discharge Recommendations: nursing facility, skilled  Discharge Equipment Recommendations:  bedside commode, walker, rolling  Barriers to discharge:  Decreased caregiver support    Assessment:     Alejandro Farrar is a 72 y.o. male with a medical diagnosis of Hypertensive emergency.  He presents with  weakness, impaired endurance, impaired self care skills, impaired functional mobility, gait instability, impaired balance, impaired cognition, decreased coordination, decreased safety awareness, impaired coordination, impaired cardiopulmonary response to activity.     Rehab Prognosis:  Good; patient would benefit from acute skilled OT services to address these deficits and reach maximum level of function.       Plan:     Patient to be seen 5 x/week to address the above listed problems via self-care/home management, therapeutic activities, therapeutic exercises  Plan of Care Expires: 03/16/23  Plan of Care Reviewed with: patient    Subjective     Pain/Comfort:  Pain Rating 1: 0/10    Objective:     Communicated with: RN prior to session.  Patient found HOB elevated with peripheral IV, telemetry (avasys) upon OT entry to room.    General Precautions: Standard, fall    Orthopedic Precautions:N/A  Braces: N/A  Respiratory Status: Room air     Occupational Performance:     Bed Mobility:    Supine > sitting: mod A for initiation, sequencing and coordination of bed mobility tasks. Pt limited by confusion and required continual verbal and tactile cues for continuation and completion of supine to sitting  Sit to supine: SBA with cues   Pt sat EOB with SBA     Functional Mobility/Transfers:  Sit <> stand: min-mod A with RW. Pt performed 4 sit <> stand transfers. Unable to progress functional ambulation to the bathroom 2/2 loose stools and urinary incontinence in standing at bedside.    Functional Mobility: Functional standing performed to improve activity tolerance for increased endurance and independence with ADLs as well as functional balance retraining for fall prevention.      Activities of Daily Living:  UBD: min A to doff/don hospital gown while seated EOB  LBD: Max A to doff/don socks seated EOB  Toileting: max A in standing for perineal hygiene  Grooming: SBA while seated EOB for face hygiene       [facility] 6 Click ADL: 15    Treatment & Education:  OT role, plan of care, progression of goals, importance of continued OOB activity, ADL/functional mobility/transfer retraining, fall prevention, safety precautions, call don't fall    Patient left HOB elevated with all lines intact, call button in reach, bed alarm on, and RN notified    GOALS:   Multidisciplinary Problems       Occupational Therapy Goals          Problem: Occupational Therapy    Goal Priority Disciplines Outcome Interventions   Occupational Therapy Goal     OT, PT/OT Ongoing, Progressing    Description: Goals to be met by: 2/28/2023     Patient will increase functional independence with ADLs by performing:    UE Dressing with Supervision.  LE Dressing with Minimal Assistance.  Grooming while standing at sink with Contact Guard Assistance.  Toileting from bedside commode with Moderate Assistance for hygiene and clothing management.   Step transfer with Minimal Assistance  Toilet transfer to bedside commode with Moderate Assistance.  Pt will follow 100% of simple one step commands.  Pt will answer 3/3 orientation questions accurately.     Completed Goals:   UE Dressing with Minimal Assistance.  MET 2/15/2023                       Time Tracking:     OT Date of Treatment: 02/17/23  OT Start Time: 0924  OT Stop Time: 0953  OT Total Time (min): 29 min    Billable Minutes:Self Care/Home Management 29 minutes    OT/ANABEL: OT          2/17/2023

## 2023-02-17 NOTE — TELEPHONE ENCOUNTER
Spoke to the pt's sister and informed her that the provider do not see dementia and she that she can contact the Neurology clinic on [address] to get the pt scheduled. She stated okay. The pt's number was disconnected

## 2023-02-17 NOTE — PLAN OF CARE
Adequate for Discharge      Problem: Infection  Goal: Absence of Infection Signs and Symptoms  Outcome: Met     Problem: Communication Impairment (Mechanical Ventilation, Invasive)  Goal: Effective Communication  Outcome: Met     Problem: Device-Related Complication Risk (Mechanical Ventilation, Invasive)  Goal: Optimal Device Function  Outcome: Met     Problem: Inability to Wean (Mechanical Ventilation, Invasive)  Goal: Mechanical Ventilation Liberation  Outcome: Met     Problem: Nutrition Impairment (Mechanical Ventilation, Invasive)  Goal: Optimal Nutrition Delivery  Outcome: Met     Problem: Skin and Tissue Injury (Mechanical Ventilation, Invasive)  Goal: Absence of Device-Related Skin and Tissue Injury  Outcome: Met     Problem: Ventilator-Induced Lung Injury (Mechanical Ventilation, Invasive)  Goal: Absence of Ventilator-Induced Lung Injury  Outcome: Met     Problem: Communication Impairment (Artificial Airway)  Goal: Effective Communication  Outcome: Met     Problem: Device-Related Complication Risk (Artificial Airway)  Goal: Optimal Device Function  Outcome: Met     Problem: Noninvasive Ventilation Acute  Goal: Effective Unassisted Ventilation and Oxygenation  Outcome: Met     Problem: Adult Inpatient Plan of Care  Goal: Plan of Care Review  Outcome: Met  Goal: Patient-Specific Goal (Individualized)  Outcome: Met  Goal: Absence of Hospital-Acquired Illness or Injury  Outcome: Met  Goal: Optimal Comfort and Wellbeing  Outcome: Met  Goal: Readiness for Transition of Care  Outcome: Met     Problem: Fluid and Electrolyte Imbalance (Acute Kidney Injury/Impairment)  Goal: Fluid and Electrolyte Balance  Outcome: Met     Problem: Oral Intake Inadequate (Acute Kidney Injury/Impairment)  Goal: Optimal Nutrition Intake  Outcome: Met     Problem: Renal Function Impairment (Acute Kidney Injury/Impairment)  Goal: Effective Renal Function  Outcome: Met     Problem: Skin Injury Risk Increased  Goal: Skin Health and  Integrity  Outcome: Met     Problem: Fall Injury Risk  Goal: Absence of Fall and Fall-Related Injury  Outcome: Met     Problem: Restraint, Nonbehavioral (Nonviolent)  Goal: Absence of Harm or Injury  Outcome: Met

## 2023-02-17 NOTE — CARE UPDATE
02/16/23 2001   Patient Assessment/Suction   Level of Consciousness (AVPU) alert   Respiratory Effort Normal;Unlabored   Expansion/Accessory Muscles/Retractions no use of accessory muscles;expansion symmetric;no retractions   All Lung Fields Breath Sounds equal bilaterally;diminished   Rhythm/Pattern, Respiratory depth regular;pattern regular;unlabored   PRE-TX-O2   Device (Oxygen Therapy) room air   SpO2 96 %   Pulse Oximetry Type Intermittent   $ Pulse Oximetry - Multiple Charge Pulse Oximetry - Multiple   Pulse 96   Preset CPAP/BiPAP Settings   Mode Of Delivery BiPAP S/T;Standby   $ Initial CPAP/BiPAP Setup? No   $ Is patient using? No/refused   Reason patient is not wearing? Patient refused

## 2023-02-17 NOTE — PT/OT/SLP PROGRESS
Physical Therapy Treatment    Patient Name:  Alejandro Farrar   MRN:  [id number]    Recommendations:     Discharge Recommendations: nursing facility, skilled (however, pt's sister taking him home)  Discharge Equipment Recommendations: bedside commode, walker, rolling  Barriers to discharge: Decreased caregiver support    Assessment:     Alejandro Farrar is a 72 y.o. male admitted with a medical diagnosis of Hypertensive emergency.  He presents with the following impairments/functional limitations: weakness, impaired endurance, impaired self care skills, impaired functional mobility, gait instability, impaired balance, impaired cognition, decreased coordination, decreased lower extremity function, decreased safety awareness ;pt with fair mobility today, shuffling steps and dec. Endurance noted.    Rehab Prognosis: Good and Fair; patient would benefit from acute skilled PT services to address these deficits and reach maximum level of function.    Recent Surgery: * No surgery found *      Plan:     During this hospitalization, patient to be seen 5 x/week to address the identified rehab impairments via gait training, therapeutic activities, therapeutic exercises, neuromuscular re-education and progress toward the following goals:    Plan of Care Expires:  03/16/23    Subjective     Chief Complaint: none stated  Patient/Family Comments/goals: pt agreeable to amb. To bathroom. Pt reports his sister will help him at home. Sister arrived at end of session, educated her on safety, using RW and assisting pt w/amb. At home.  Pain/Comfort:  Pain Rating 1: 0/10  Pain Rating Post-Intervention 1: 0/10      Objective:     Communicated with nurse prior to session.  Patient found sitting edge of bed with PCT present ([employer] monitoring, and sitter present) upon PT entry to room.     General Precautions: Standard, fall  Orthopedic Precautions: N/A  Braces: N/A  Respiratory Status: Room air     Functional Mobility:  Transfers:     Sit to Stand:   contact guard assistance and minimum assistance with rolling walker  Gait: amb'd 25' x 2 w/ RW and CGA/min.A, cueing to  feet, pt shuffles.       AM-PAC 6 CLICK MOBILITY  Turning over in bed (including adjusting bedclothes, sheets and blankets)?: 3  Sitting down on and standing up from a chair with arms (e.g., wheelchair, bedside commode, etc.): 3  Moving from lying on back to sitting on the side of the bed?: 3  Moving to and from a bed to a chair (including a wheelchair)?: 3  Need to walk in hospital room?: 3  Climbing 3-5 steps with a railing?: 2  Basic Mobility Total Score: 17       Treatment & Education:  Pt perf'd commode t/f's w/ min.A, pt was incontinent of bowel and req'd totA for hygiene.     Patient left sitting edge of bed with  nurse notified and sister present..    GOALS:   Multidisciplinary Problems       Physical Therapy Goals          Problem: Physical Therapy    Goal Priority Disciplines Outcome Goal Variances Interventions   Physical Therapy Goal     PT, PT/OT Ongoing, Progressing     Description: Goals to be met by: 3/16/2023    Patient will increase functional independence with mobility by performin. Sit<>stand with supervision with rolling walker.  2. Gait x 50 feet with contact guard assistance with rolling walker.  3. Standing balance x2mins with contact guard assistance.                           Time Tracking:     PT Received On: 23  PT Start Time: 1442     PT Stop Time: 1508  PT Total Time (min): 26 min     Billable Minutes: Gait Training 14 and Therapeutic Activity 12    Treatment Type: Treatment  PT/PTA: PTA     PTA Visit Number: 2     2023

## 2023-02-17 NOTE — PLAN OF CARE
[facility] ([address])      HOME HEALTH ORDERS  FACE TO FACE ENCOUNTER    Patient Name: Alejandro Farrar  YOB: 1950    PCP: Primary Doctor No   PCP Address: None  PCP Phone Number: None  PCP Fax: None    Encounter Date: 2/12/23    Admit to Home Health    Diagnoses:  Active Hospital Problems    Diagnosis  POA    *Hypertensive emergency [I16.1]  Yes    Debility [R53.81]  Yes    Acute hypercapnic respiratory failure [J96.02]  Yes    Encephalopathy, metabolic [G93.41]  Yes    Acute renal failure [N17.9]  Yes    Polysubstance abuse [F19.10]  Yes    Metabolic acidosis [E87.20]  Yes    Hypothermia [T68.XXXA]  Yes    Leukocytosis [D72.829]  Yes    Elevated troponin [R77.8]  Yes    Elevated brain natriuretic peptide (BNP) level [R79.89]  Yes      Resolved Hospital Problems   No resolved problems to display.       Follow Up Appointments:  No future appointments.    Allergies:Review of patient's allergies indicates:  Not on File    Medications: Review discharge medications with patient and family and provide education.    Current Facility-Administered Medications   Medication Dose Route Frequency Provider Last Rate Last Admin    acetaminophen tablet 650 mg  650 mg Oral Q6H PRN NEL Sinha MD   650 mg at 02/14/23 1557    azelastine 137 mcg (0.1 %) nasal spray 137 mcg  1 spray Nasal BID NEL Sinha MD   137 mcg at 02/17/23 1002    chlorhexidine 0.12 % solution 15 mL  15 mL Mouth/Throat BID Shilpi Rosenberg MD   15 mL at 02/17/23 1002    cloNIDine 0.2 mg/24 hr td ptwk 1 patch  1 patch Transdermal Q7 Days Shilpi Rosenberg MD   1 patch at 02/13/23 1451    heparin (porcine) injection 5,000 Units  5,000 Units Subcutaneous Q8H Shilpi Rosenberg MD   5,000 Units at 02/17/23 0536    hydrOXYzine HCL tablet 50 mg  50 mg Oral QID PRN NEL Sinha MD   50 mg at 02/16/23 1529    LORazepam injection 2 mg  2 mg Intravenous Q3H PRN Shilpi Rosenberg MD   2 mg at 02/16/23 1207    mupirocin 2 % ointment   Nasal BID Shilpi FINK  MD Shakira   Given at 02/17/23 1002    NIFEdipine 24 hr tablet 60 mg  60 mg Oral BID D. Larry Sinha MD   60 mg at 02/17/23 1002    ondansetron disintegrating tablet 4 mg  4 mg Oral Q6H PRN D. Larry Sinha MD        ondansetron injection 4 mg  4 mg Intravenous Q6H PRN D. Larry Sinha MD        pantoprazole injection 40 mg  40 mg Intravenous Daily Shilpi Rosenberg MD   40 mg at 02/17/23 1005     Current Discharge Medication List        START taking these medications    Details   cloNIDine 0.2 mg/24 hr td ptwk (CATAPRES) 0.2 mg/24 hr Place 1 patch onto the skin every 7 days.  Qty: 4 patch, Refills: 0      NIFEdipine (PROCARDIA-XL) 60 MG (OSM) 24 hr tablet Take 1 tablet (60 mg total) by mouth 2 (two) times a day.  Qty: 60 tablet, Refills: 0      ondansetron (ZOFRAN-ODT) 4 MG TbDL Take 1 tablet (4 mg total) by mouth every 6 (six) hours as needed (nausea).  Qty: 30 tablet, Refills: 0               I have seen and examined this patient within the last 30 days. My clinical findings that support the need for the home health skilled services and home bound status are the following:   Weakness/numbness causing balance and gait disturbance due to Weakness/Debility making it taxing to leave home.     Diet:   cardiac diet    Referrals/ Consults  Physical Therapy to evaluate and treat. Evaluate for home safety and equipment needs; Establish/upgrade home exercise program. Perform / instruct on therapeutic exercises, gait training, transfer training, and Range of Motion.  Occupational Therapy to evaluate and treat. Evaluate home environment for safety and equipment needs. Perform/Instruct on transfers, ADL training, ROM, and therapeutic exercises.  Speech Therapy  to evaluate and treat for  Language and Cognition.   to evaluate for community resources/long-range planning.  Aide to provide assistance with personal care, ADLs, and vital signs.    Activities:   activity as tolerated    Nursing:   Agency to admit patient  within 24 hours of hospital discharge unless specified on physician order or at patient request    SN to complete comprehensive assessment including routine vital signs. Instruct on disease process and s/s of complications to report to MD. Review/verify medication list sent home with the patient at time of discharge  and instruct patient/caregiver as needed. Frequency may be adjusted depending on start of care date.     Skilled nurse to perform up to 3 visits PRN for symptoms related to diagnosis    Notify MD if SBP > 160 or < 90; DBP > 90 or < 50; HR > 120 or < 50; Temp > 101; O2 < 88%    Ok to schedule additional visits based on staff availability and patient request on consecutive days within the home health episode.    When multiple disciplines ordered:    Start of Care occurs on Sunday - Wednesday schedule remaining discipline evaluations as ordered on separate consecutive days following the start of care.    Thursday SOC -schedule subsequent evaluations Friday and Monday the following week.     Friday - Saturday SOC - schedule subsequent discipline evaluations on consecutive days starting Monday of the following week.    For all post-discharge communication and subsequent orders please contact patient's primary care physician.    Miscellaneous   Routine Skin for Bedridden Patients: Instruct patient/caregiver to apply moisture barrier cream to all skin folds and wet areas in perineal area daily and after baths and all bowel movements.    Wound Care Orders  no    I certify that this patient is confined to his home and needs intermittent skilled nursing care, physical therapy, speech therapy, and occupational therapy.

## 2023-02-17 NOTE — PLAN OF CARE
Sitter at bedside throughout shift.    Problem: Infection  Goal: Absence of Infection Signs and Symptoms  Outcome: Ongoing, Progressing     Problem: Adult Inpatient Plan of Care  Goal: Plan of Care Review  Outcome: Ongoing, Progressing  Goal: Patient-Specific Goal (Individualized)  Outcome: Ongoing, Progressing  Goal: Absence of Hospital-Acquired Illness or Injury  Outcome: Ongoing, Progressing  Goal: Optimal Comfort and Wellbeing  Outcome: Ongoing, Progressing  Goal: Readiness for Transition of Care  Outcome: Ongoing, Progressing     Problem: Fluid and Electrolyte Imbalance (Acute Kidney Injury/Impairment)  Goal: Fluid and Electrolyte Balance  Outcome: Ongoing, Progressing     Problem: Oral Intake Inadequate (Acute Kidney Injury/Impairment)  Goal: Optimal Nutrition Intake  Outcome: Ongoing, Progressing     Problem: Renal Function Impairment (Acute Kidney Injury/Impairment)  Goal: Effective Renal Function  Outcome: Ongoing, Progressing     Problem: Skin Injury Risk Increased  Goal: Skin Health and Integrity  Outcome: Ongoing, Progressing     Problem: Fall Injury Risk  Goal: Absence of Fall and Fall-Related Injury  Outcome: Ongoing, Progressing

## 2023-02-17 NOTE — PLAN OF CARE
Follow up appointments have been scheduled and added to AVS.    Neurology clinic to notify patient with upcoming appointment. [facility] to assume care on discharge.     Sister Carolina will provide transportation home.   02/17/23 1051   Final Note   Assessment Type Final Discharge Note   Anticipated Discharge Disposition Home-Health   Hospital Resources/Appts/Education Provided Provided patient/caregiver with written discharge plan information;Appointments scheduled and added to AVS;Appointments scheduled by Navigator/Coordinator   Post-Acute Status   Post-Acute Authorization Home Health   Home Health Status Set-up Complete/Auth obtained   Patient choice form signed by patient/caregiver List with quality metrics by geographic area provided;List from CMS Compare;List from System Post-Acute Care   Discharge Delays None known at this time     [facility] ([address])  Discharge Final Note    Primary Care Provider: Primary Doctor No    Expected Discharge Date: 2/17/2023    Final Discharge Note (most recent)       Final Note - 02/17/23 1051          Final Note    Assessment Type Final Discharge Note (P)      Anticipated Discharge Disposition Home-Health Care Svc (P)      Hospital Resources/Appts/Education Provided Provided patient/caregiver with written discharge plan information;Appointments scheduled and added to AVS;Appointments scheduled by Navigator/Coordinator (P)         Post-Acute Status    Post-Acute Authorization Home Health (P)      Home Health Status Set-up Complete/Auth obtained (P)      Patient choice form signed by patient/caregiver List with quality metrics by geographic area provided;List from CMS Compare;List from System Post-Acute Care (P)      Discharge Delays None known at this time (P)                      Important Message from Medicare             Contact Info       [facility]   Specialty: Internal Medicine    [address]  [facility] [address]    Phone: [phone]       Next Steps: Follow up in 2 week(s)    Instructions: post-hospital follow-up and to establish PCP    [facility]   Specialty: Psychiatry    [address]  [address]  [facility] [address]   Phone: [phone]       Next Steps: Follow up in 2 week(s)    Instructions: follow-up on suspected dementia    Clinic will notify you with follow up appointment date and time    [facility]   Specialty: Home Health Services    [address]  [address]  [facility] [address]   Phone: [phone]       Next Steps: Follow up

## 2023-02-17 NOTE — NURSING
Patient discharged home with home health. IV (x1) removed. Telemetry removed. Home equipment delivered to bedside. Sister at bedside. Discharge instructions and education provided to patient and sister. Patient and sister verbalized understanding. All patient belongings packed in bags to be taken with patient and sister. Patient and sister awaiting transportation.

## 2023-02-17 NOTE — TELEPHONE ENCOUNTER
----- Message from Yahaira Pollard MA sent at 2/17/2023 10:50 AM CST -----  Type: Patient Call Back    Who called:  Case Management     What is the request in detail: need a hospital follow up for dementia.. being discharged today.. PLEASE CALL THE PT.    Can the clinic reply by [facility]?No    Would the patient rather a call back or a response via [facility]? yes    Best call back number: [phone] (home)

## 2023-02-18 NOTE — DISCHARGE SUMMARY
"Religious - [facility] Surg ([address] Medicine  Discharge Summary      Patient Name: Alejandro Farrar  MRN: [id number]  ALVERTO: [id number]  Patient Class: IP- Inpatient  Admission Date: 2/12/2023  Hospital Length of Stay: 5 days  Discharge Date and Time: 2/17/2023  4:18 PM  Attending Physician: Sheron att. providers found   Discharging Provider: HERMELINDO Sinha MD  Primary Care Provider: Primary Doctor Sheron    Primary Care Team: Networked reference to record PCT     HPI:   Unknown male was found down in the street.  It was reported that a bystander said his name was "Alejandro" but that person is no longer available to be interviewed.  The fire department reported pinpoint pupils with agonal respiration and he was given Narcan without improvement in his mentation.  On arrival to the hospital, he was noted to be drooling and contracted, and he started having seizure-like activity witnessed by staff.  Physical exam by the ER physician document eye deviation to the left and downward.  He was unable to protect his airway and was intubated.  Noted to be hypothermic with a temperature of 91.6° F, heart rate 131, duration 34, blood pressure 242/144.  Treatment initiated with Cardene drip for blood pressure control and patient was given empiric cefepime and vancomycin after obtaining cultures.  Workup in the ER, remarkable for head CT showed no acute abnormalities but showed "Foci of low attenuation involving the bilateral corona radiata and basal ganglia are suspected to reflect that of remote infarct." U tox remarkable for cocaine and marijuana, BUN and creatinine of 33/2.3, , , troponin 0.049, lactate 5.1 procalcitonin 1.09, serum alcohol negative, WBC 23.42, UA overall not remarkable, chest x-ray without definitive infiltrate, ABG pH 7.184 pCO2 71 PO2 330 bicarb 29.        * No surgery found *      Hospital Course:   Admitted as unknown male with hypertensive emergency with acute encephalopathy, respiratory failure, " and acute kidney injury. Intubated for airway protection with Utox positive for cocaine and marijuana. CT Head was performed showing no acute abnormality but possible remote infarct and microvascular ischemic changes. Initially concern for seizure-like activity in ER and noted to be hypothermic; started empiric antibiotic therapy and nicardipine gtt. Extubated to BiPAP 02/13. Mentation gradually improved and was able to report his name as Alejandro Farrar. Family located and reported h/o stroke and ?dementia. Renal function improved and blood pressure stabilized with PO antihypertensives. Therapy services recommended SNF; patient and family wished to go home with home health instead. With clinical improvement and vital stability, he was prepared for discharge home with home health and outpatient PCP and neuropsychology follow-up.       Goals of Care Treatment Preferences:  Code Status: Full Code      Consults:   Consults (From admission, onward)        Status Ordering Provider     IP consult to dietary  Once        Provider:  (Not yet assigned)    Completed RAVI MANLEY     Inpatient consult to Critical Care Medicine  Once        Provider:  (Not yet assigned)    Completed RAVI MANLEY          Neuro  Encephalopathy, metabolic  - As above.    Pulmonary  Acute hypercapnic respiratory failure  - As above.    Cardiac/Vascular  * Hypertensive emergency  - Hypertensive emergency with end-organ damage with systolic pressures to the 240s.  - Encephalopathy likely multifactorial with potential stroke, seizure, hypertension, sepsis, and toxins all potential contributors.  - CT Head without acute infarct; UTox positive for cocaine, marijuana.  - Hypothermic in addition initially though was found down outdoors.  - Significant continued improvement in mentation. Able to state name and family located. Discussed with sister; reports he has some dementia at baseline.  - Blood cultures show 1/4 bottles with GPCs; coag-negative staph,  likely contaminant. Hold further antibiotic therapy.  - Weaned from nicardipine; continue nifedipine 60mg PO BID, clonidine 0.2mg transdermal weekly.  - U/S LUE without evidence of thrombosis.    Elevated troponin  - As above.    Renal/  Metabolic acidosis  - As above.    Acute renal failure  - Unknown baseline; continuing to improve.  - Monitor.    Oncology  Leukocytosis  - As above.    Other  Debility  - PT/OT.    Hypothermia  - As above.    Polysubstance abuse  - As above.    Final Active Diagnoses:    Diagnosis Date Noted POA    PRINCIPAL PROBLEM:  Hypertensive emergency [I16.1] 02/12/2023 Yes    Debility [R53.81] 02/16/2023 Yes    Acute hypercapnic respiratory failure [J96.02] 02/12/2023 Yes    Encephalopathy, metabolic [G93.41] 02/12/2023 Yes    Acute renal failure [N17.9] 02/12/2023 Yes    Polysubstance abuse [F19.10] 02/12/2023 Yes    Metabolic acidosis [E87.20] 02/12/2023 Yes    Hypothermia [T68.XXXA] 02/12/2023 Yes    Leukocytosis [D72.829] 02/12/2023 Yes    Elevated troponin [R77.8] 02/12/2023 Yes    Elevated brain natriuretic peptide (BNP) level [R79.89] 02/12/2023 Yes      Problems Resolved During this Admission:       Discharged Condition: fair    Disposition: [place]-Health Care [facility]    Follow Up:   Follow-up Information     [facility] - Internal Medicine Follow up in 2 week(s).    Specialty: Internal Medicine  Why: post-hospital follow-up and to establish PCP  Contact information:  [address]  [facility] [address] [phone]  Additional information:  Internal Medicine - [facility], 8th Floor, [address]   Please park in [address] and use [place] elevators           [facility] - Neurology Follow up in 2 week(s).    Specialty: Psychiatry  Why: follow-up on suspected dementia    Clinic will notify you with follow up appointment date and time  Contact information:  [address]  [address]  [facility] [address] [phone]  Additional  "information:  Neuropsychology - [facility], 8th Floor   Please park in [address] and use [place] elevators           [facility] [place] Follow up.    Specialty: Home Health Services  Why: Will see you on Friday.  Contact information:  [address]  [address]  [place] [address]  [phone]                       Patient Instructions:      COMMODE FOR HOME USE     Order Specific Question Answer Comments   Type: Standard    Height: 5' 10" (1.778 m)    Weight: 71.2 kg (156 lb 15.5 oz)    Does patient have medical equipment at home? cane, quad    Length of need (1-99 months): 99      WALKER FOR HOME USE     Order Specific Question Answer Comments   Type of Walker: Adult (5'4"-6'6")    With wheels? Yes    Height: 5' 10" (1.778 m)    Weight: 71.2 kg (156 lb 15.5 oz)    Length of need (1-99 months): 99    Does patient have medical equipment at home? cane, quad    Please check all that apply: Patient's condition impairs ambulation.    Please check all that apply: Walker will be used for gait training.    Please check all that apply: Patient needs help to get in and out of chair.    Please check all that apply: Patient is unable to safely ambulate without equipment.      Ambulatory referral/consult to Internal Medicine   Standing Status: Future   Referral Priority: Routine Referral Type: Consultation   Referral Reason: Specialty Services Required   Requested Specialty: Internal Medicine   Number of Visits Requested: 1     Ambulatory referral/consult to Adult Neuropsychology   Standing Status: Future   Referral Priority: Routine Referral Type: Psychiatric   Referral Reason: Specialty Services Required   Number of Visits Requested: 1     Diet Cardiac     Notify your health care provider if you experience any of the following:  temperature >100.4     Notify your health care provider if you experience any of the following:  increased confusion or weakness     Notify your health care provider if " you experience any of the following:  persistent dizziness, light-headedness, or visual disturbances     Notify your health care provider if you experience any of the following:  severe persistent headache     Notify your health care provider if you experience any of the following:  difficulty breathing or increased cough     Notify your health care provider if you experience any of the following:  persistent nausea and vomiting or diarrhea     Activity as tolerated       Significant Diagnostic Studies:   CBC:  Recent Labs   Lab 02/15/23  0333 02/16/23 0449 02/17/23 0446   WBC 10.93 8.92 8.43   HGB 14.4 14.3 14.3   HCT 41.4 41.3 42.0    186 188   GRAN 82.9*  9.1* 74.0*  6.6 82.1*  6.9   LYMPH 10.0*  1.1 17.0*  1.5 9.3*  0.8*   MONO 5.2  0.6 7.0  0.6 6.9  0.6   EOS 0.1 0.1 0.1   BASO 0.05 0.05 0.04     CMP:  Recent Labs   Lab 02/15/23  0333 02/15/23  1422 02/16/23 0449 02/17/23 0446    143 143 141   K 2.9* 3.7 3.6 3.6    106 109 109   CO2 24 28 26 26   BUN 23 22 19 18   CREATININE 1.2 1.5* 1.2 1.1    101 109 104   CALCIUM 9.0 10.0 9.6 9.4   MG 1.9 2.3  2.3 2.0 1.8   PHOS 2.3*  --  2.5* 3.0   ALKPHOS 63  --  62 66   AST 22  --  18 19   ALT 15  --  18 17   BILITOT 0.8  --  0.8 0.8   PROT 6.9  --  6.8 6.9   ALBUMIN 3.0*  --  3.1* 3.0*   ANIONGAP 10 9 8 6*     Imaging Results          US Retroperitoneal Complete (Final result)  Result time 02/12/23 18:18:32    Final result by Praneeth Malik MD (02/12/23 18:18:32)                 Impression:      1. There is suboptimal evaluation of the left kidney given its position.  The left renal resistive index is elevated as compared to the right, acute medical renal disease on the left is a consideration although not confirmed.  No hydronephrosis.      Electronically signed by: Praneeth Malik MD  Date:    02/12/2023  Time:    18:18             Narrative:    EXAMINATION:  US RETROPERITONEAL COMPLETE    CLINICAL  HISTORY:  ARF;    TECHNIQUE:  Ultrasound of the kidneys and urinary bladder was performed including color flow and Doppler evaluation of the kidneys.    COMPARISON:  None.    FINDINGS:  Right kidney: The right kidney measures 11.6 cm. No cortical thinning. No loss of corticomedullary distinction. Resistive index measures 0.65.  No mass. No renal stone. No hydronephrosis.    Left kidney: The left kidney measures 8.7 cm. No cortical thinning. No loss of corticomedullary distinction. Resistive index measures 0.75.  No mass. No renal stone. No hydronephrosis.    The urinary bladder is decompressed, limiting evaluation.                               CT Head Without Contrast (Final result)  Result time 02/12/23 14:54:12    Final result by Praneeth Malik MD (02/12/23 14:54:12)                 Impression:      1. There is sequela of chronic microvascular ischemic change and senescent change.  Foci of low attenuation involving the bilateral corona radiata and basal ganglia are suspected to reflect that of remote infarct however no previous exams are available for comparison, correlation with current symptomatology is advised as age-indeterminate infarcts are not excluded.      Electronically signed by: Praneeth Malik MD  Date:    02/12/2023  Time:    14:54             Narrative:    EXAMINATION:  CT HEAD WITHOUT CONTRAST    CLINICAL HISTORY:  Mental status change, unknown cause;    TECHNIQUE:  Low dose axial images were obtained through the head.  Coronal and sagittal reformations were also performed. Contrast was not administered.    COMPARISON:  None.    FINDINGS:  There is motion artifact.    There is generalized cerebral volume loss.  There is hypoattenuation in a periventricular fashion, likely sequela of chronic microvascular ischemic change.There are foci of low attenuation involving the bilateral corona radiata extending to the bilateral basal ganglia suggesting remote infarcts although no previous exams are  available for comparison.  There is septum cavum pellucidum.  There is no evidence of acute major vascular territory infarct, hemorrhage, or mass.  There is no hydrocephalus.  There are no abnormal extra-axial fluid collections.  The paranasal sinuses and mastoid air cells are clear, and there is no evidence of calvarial fracture.  The visualized soft tissues are unremarkable.                               X-Ray Chest AP Portable (Final result)  Result time 02/12/23 13:57:01    Final result by Scout Gresham DO (02/12/23 13:57:01)                 Impression:      Please see above      Electronically signed by: Scout Gresham DO  Date:    02/12/2023  Time:    13:57             Narrative:    EXAMINATION:  XR CHEST AP PORTABLE    CLINICAL HISTORY:  Sepsis;    TECHNIQUE:  Single frontal view of the chest was performed.    COMPARISON:  None    FINDINGS:  Endotracheal tube tip projected over the trachea air column approximately 4.4 cm above the karina.  There is a feeding tube which courses across the mediastinum into the abdomen with looping configuration in the upper abdomen tip not included in the field of view.  Question surgical clips left ann-marie.  Sternotomy wires overlie the cardiomediastinal silhouette.  There is slight fullness of the hilar vasculature which may be related to positioning versus vascular congestion.  There is no lung consolidation.  No large pleural effusion or pneumothorax.  Further evaluation as warranted clinically.                                Pending Diagnostic Studies:     None         Medications:  Reconciled Home Medications:      Medication List      START taking these medications    cloNIDine 0.2 mg/24 hr td ptwk 0.2 mg/24 hr  Commonly known as: CATAPRES  Place 1 patch onto the skin every 7 days.  Start taking on: February 20, 2023     NIFEdipine 60 MG (OSM) 24 hr tablet  Commonly known as: PROCARDIA-XL  Take 1 tablet (60 mg total) by mouth 2 (two) times a day.     ondansetron 4 MG  Tbdl  Commonly known as: ZOFRAN-ODT  Take 1 tablet (4 mg total) by mouth every 6 (six) hours as needed (nausea).            Indwelling Lines/Drains at time of discharge:   Lines/Drains/Airways     None                 Time spent on the discharge of patient: 35 minutes         HERMELINDO Sinha MD  Department of Hospital Medicine  [facility] ([address])

## 2023-02-19 LAB
BACTERIA BLD CULT: ABNORMAL

## 2023-02-28 NOTE — PHYSICIAN QUERY
PT Name: Alejandro Farrar  MR #: [id number]     Documentation Clarification      CDS/: Jessica Mendoza RN              Contact information:[email or web address]    This form is a permanent document in the medical record.     Query Date: February 28, 2023    By submitting this query, we are merely seeking further clarification of documentation. Please utilize your independent clinical judgment when addressing the question(s) below.    The Medical Record reflects the following:    Supporting Clinical Findings Location in Medical Record   Encephalopathy likely multifactorial in nature with possible CVA versus seizure vs hypertensive; cannot rule out sepsis versus toxic encephalopathy due to illicit substances given U tox positive for cocaine and marijuana   H&P   Unknown pt brought in by EMS for suspected overdose; pt found unresponsive, not breathing, given 6mg narcan total en route; upon arrival to ED, pt actively seizing    Polysubstance abuse  - UTox positive for cocaine and marijuana H&P                                                                            Provider, please provide diagnosis or diagnoses associated with above clinical findings.    [   ] Poisoning by cocaine accidental   [   ] Poisoning by cocaine unaccidental   [ X ] Polysubstance abuse   [  ] Other (please specify): ____________

## 2023-03-03 PROBLEM — I16.1 HYPERTENSIVE EMERGENCY: Status: RESOLVED | Noted: 2023-02-12 | Resolved: 2023-03-03

## 2023-03-03 PROBLEM — N17.9 ACUTE RENAL FAILURE: Status: RESOLVED | Noted: 2023-02-12 | Resolved: 2023-03-03

## 2023-03-03 PROBLEM — T68.XXXA HYPOTHERMIA: Status: RESOLVED | Noted: 2023-02-12 | Resolved: 2023-03-03

## 2023-03-03 PROBLEM — D72.829 LEUKOCYTOSIS: Status: RESOLVED | Noted: 2023-02-12 | Resolved: 2023-03-03

## 2023-03-03 PROBLEM — J96.02 ACUTE HYPERCAPNIC RESPIRATORY FAILURE: Status: RESOLVED | Noted: 2023-02-12 | Resolved: 2023-03-03

## 2023-03-03 PROBLEM — E87.20 METABOLIC ACIDOSIS: Status: RESOLVED | Noted: 2023-02-12 | Resolved: 2023-03-03

## 2023-03-07 ENCOUNTER — TELEPHONE (OUTPATIENT)
Dept: NEUROLOGY | Facility: CLINIC | Age: 73
End: 2023-03-07
Payer: MEDICARE

## 2023-03-07 NOTE — PHYSICIAN QUERY
PT Name: Alejandro Farrar  MR #: [id number]     DOCUMENTATION CLARIFICATION      CDS/: Jessica Mendoza RN              Contact information:[email or web address]  This form is a permanent document in the medical record.    Query Date: March 7, 2023    By submitting this query, we are merely seeking further clarification of documentation to reflect the severity of illness of your patient. Please utilize your independent clinical judgment when addressing the question(s) below.     The Medical Record contains the following:   Indicators   Supporting Clinical Findings Location in Medical Record    Chest Pain, Angina      Coronary Artery Disease     x EKG Sinus rhythm with 1st-degree block with PVCs, LVH repolarization abnormality, prolonged QT rate is 98   ED MD   x Troponin Troponin=0.049-0.752-0.471   Lab 2-12 to 2-13    Echo Results      Angiography     x Documentation of acute cardiac condition NSTEMI likely type 2.     Pulmonology note 2-13    Medication/Treatment Cardene drip for blood pressure control       x Other Hypertensive emergency Pulmonology note 2-13      Provider, please clarify the diagnosis related to the above documentation:  [ X ] NSTEMI/Myocardial Infarction Type 2 due to HTN emergency   [   ] NSTEMI/Myocardial Infarction Type 2 due to (please specify): __________   [   ] Other Cardiac Diagnosis (please specify): _______________       Please document in your progress notes daily for the duration of treatment until resolved, and include in your discharge summary.  Form No. [id number]